# Patient Record
Sex: MALE | Race: OTHER | HISPANIC OR LATINO | URBAN - METROPOLITAN AREA
[De-identification: names, ages, dates, MRNs, and addresses within clinical notes are randomized per-mention and may not be internally consistent; named-entity substitution may affect disease eponyms.]

---

## 2017-08-28 ENCOUNTER — INPATIENT (INPATIENT)
Facility: HOSPITAL | Age: 52
LOS: 7 days | Discharge: HOME | DRG: 444 | End: 2017-09-05
Attending: INTERNAL MEDICINE | Admitting: INTERNAL MEDICINE
Payer: MEDICAID

## 2017-08-28 VITALS
OXYGEN SATURATION: 97 % | WEIGHT: 149.91 LBS | DIASTOLIC BLOOD PRESSURE: 92 MMHG | TEMPERATURE: 98 F | RESPIRATION RATE: 17 BRPM | HEART RATE: 81 BPM | SYSTOLIC BLOOD PRESSURE: 128 MMHG

## 2017-08-28 LAB
ALBUMIN SERPL ELPH-MCNC: 2.6 G/DL — LOW (ref 3.3–5)
ALP SERPL-CCNC: 210 U/L — HIGH (ref 40–120)
ALT FLD-CCNC: 122 U/L — HIGH (ref 10–45)
ANION GAP SERPL CALC-SCNC: 12 MMOL/L — SIGNIFICANT CHANGE UP (ref 5–17)
APPEARANCE UR: CLEAR — SIGNIFICANT CHANGE UP
APTT BLD: 32.2 SEC — SIGNIFICANT CHANGE UP (ref 27.5–37.4)
AST SERPL-CCNC: 157 U/L — HIGH (ref 10–40)
BASOPHILS NFR BLD AUTO: 0.6 % — SIGNIFICANT CHANGE UP (ref 0–2)
BILIRUB SERPL-MCNC: 4.6 MG/DL — HIGH (ref 0.2–1.2)
BILIRUB UR-MCNC: NEGATIVE — SIGNIFICANT CHANGE UP
BLD GP AB SCN SERPL QL: NEGATIVE — SIGNIFICANT CHANGE UP
BUN SERPL-MCNC: 9 MG/DL — SIGNIFICANT CHANGE UP (ref 7–23)
CALCIUM SERPL-MCNC: 8.4 MG/DL — SIGNIFICANT CHANGE UP (ref 8.4–10.5)
CHLORIDE SERPL-SCNC: 94 MMOL/L — LOW (ref 96–108)
CO2 SERPL-SCNC: 27 MMOL/L — SIGNIFICANT CHANGE UP (ref 22–31)
COLOR SPEC: YELLOW — SIGNIFICANT CHANGE UP
CREAT SERPL-MCNC: 0.9 MG/DL — SIGNIFICANT CHANGE UP (ref 0.5–1.3)
DIFF PNL FLD: (no result)
EOSINOPHIL NFR BLD AUTO: 1.2 % — SIGNIFICANT CHANGE UP (ref 0–6)
EPI CELLS # UR: SIGNIFICANT CHANGE UP /HPF
GLUCOSE SERPL-MCNC: 130 MG/DL — HIGH (ref 70–99)
GLUCOSE UR QL: NEGATIVE — SIGNIFICANT CHANGE UP
HCT VFR BLD CALC: 35.2 % — LOW (ref 39–50)
HGB BLD-MCNC: 12.8 G/DL — LOW (ref 13–17)
INR BLD: 1.11 — SIGNIFICANT CHANGE UP (ref 0.88–1.16)
KETONES UR-MCNC: NEGATIVE — SIGNIFICANT CHANGE UP
LACTATE SERPL-SCNC: 1.9 MMOL/L — SIGNIFICANT CHANGE UP (ref 0.5–2)
LACTATE SERPL-SCNC: 2.2 MMOL/L — HIGH (ref 0.5–2)
LEUKOCYTE ESTERASE UR-ACNC: NEGATIVE — SIGNIFICANT CHANGE UP
LIDOCAIN IGE QN: 43 U/L — SIGNIFICANT CHANGE UP (ref 7–60)
LYMPHOCYTES # BLD AUTO: 32 % — SIGNIFICANT CHANGE UP (ref 13–44)
MCHC RBC-ENTMCNC: 30.8 PG — SIGNIFICANT CHANGE UP (ref 27–34)
MCHC RBC-ENTMCNC: 36.4 G/DL — HIGH (ref 32–36)
MCV RBC AUTO: 84.8 FL — SIGNIFICANT CHANGE UP (ref 80–100)
MONOCYTES NFR BLD AUTO: 8.8 % — SIGNIFICANT CHANGE UP (ref 2–14)
NEUTROPHILS NFR BLD AUTO: 57.4 % — SIGNIFICANT CHANGE UP (ref 43–77)
NITRITE UR-MCNC: NEGATIVE — SIGNIFICANT CHANGE UP
PH UR: 6.5 — SIGNIFICANT CHANGE UP (ref 5–8)
PLATELET # BLD AUTO: 152 K/UL — SIGNIFICANT CHANGE UP (ref 150–400)
POTASSIUM SERPL-MCNC: 3.4 MMOL/L — LOW (ref 3.5–5.3)
POTASSIUM SERPL-SCNC: 3.4 MMOL/L — LOW (ref 3.5–5.3)
PROT SERPL-MCNC: 6.9 G/DL — SIGNIFICANT CHANGE UP (ref 6–8.3)
PROT UR-MCNC: NEGATIVE MG/DL — SIGNIFICANT CHANGE UP
PROTHROM AB SERPL-ACNC: 12.3 SEC — SIGNIFICANT CHANGE UP (ref 9.8–12.7)
RBC # BLD: 4.15 M/UL — LOW (ref 4.2–5.8)
RBC # FLD: 18.6 % — HIGH (ref 10.3–16.9)
RBC CASTS # UR COMP ASSIST: < 5 /HPF — SIGNIFICANT CHANGE UP
RH IG SCN BLD-IMP: POSITIVE — SIGNIFICANT CHANGE UP
SODIUM SERPL-SCNC: 133 MMOL/L — LOW (ref 135–145)
SP GR SPEC: <=1.005 — SIGNIFICANT CHANGE UP (ref 1–1.03)
UROBILINOGEN FLD QL: 0.2 E.U./DL — SIGNIFICANT CHANGE UP
WBC # BLD: 6.6 K/UL — SIGNIFICANT CHANGE UP (ref 3.8–10.5)
WBC # FLD AUTO: 6.6 K/UL — SIGNIFICANT CHANGE UP (ref 3.8–10.5)
WBC UR QL: < 5 /HPF — SIGNIFICANT CHANGE UP

## 2017-08-28 PROCEDURE — 71010: CPT | Mod: 26

## 2017-08-28 PROCEDURE — 99285 EMERGENCY DEPT VISIT HI MDM: CPT | Mod: 25

## 2017-08-28 PROCEDURE — 74181 MRI ABDOMEN W/O CONTRAST: CPT | Mod: 26

## 2017-08-28 PROCEDURE — 93010 ELECTROCARDIOGRAM REPORT: CPT

## 2017-08-28 RX ORDER — SODIUM CHLORIDE 9 MG/ML
1000 INJECTION INTRAMUSCULAR; INTRAVENOUS; SUBCUTANEOUS ONCE
Qty: 0 | Refills: 0 | Status: COMPLETED | OUTPATIENT
Start: 2017-08-28 | End: 2017-08-28

## 2017-08-28 RX ORDER — SODIUM CHLORIDE 9 MG/ML
1000 INJECTION, SOLUTION INTRAVENOUS
Qty: 0 | Refills: 0 | Status: DISCONTINUED | OUTPATIENT
Start: 2017-08-28 | End: 2017-08-29

## 2017-08-28 RX ORDER — ONDANSETRON 8 MG/1
4 TABLET, FILM COATED ORAL EVERY 6 HOURS
Qty: 0 | Refills: 0 | Status: DISCONTINUED | OUTPATIENT
Start: 2017-08-28 | End: 2017-09-05

## 2017-08-28 RX ORDER — HYDROMORPHONE HYDROCHLORIDE 2 MG/ML
0.5 INJECTION INTRAMUSCULAR; INTRAVENOUS; SUBCUTANEOUS EVERY 4 HOURS
Qty: 0 | Refills: 0 | Status: DISCONTINUED | OUTPATIENT
Start: 2017-08-28 | End: 2017-09-04

## 2017-08-28 RX ORDER — METOCLOPRAMIDE HCL 10 MG
10 TABLET ORAL EVERY 6 HOURS
Qty: 0 | Refills: 0 | Status: DISCONTINUED | OUTPATIENT
Start: 2017-08-28 | End: 2017-09-05

## 2017-08-28 RX ORDER — SODIUM CHLORIDE 9 MG/ML
1000 INJECTION, SOLUTION INTRAVENOUS
Qty: 0 | Refills: 0 | Status: DISCONTINUED | OUTPATIENT
Start: 2017-08-28 | End: 2017-08-31

## 2017-08-28 RX ORDER — SODIUM CHLORIDE 9 MG/ML
1000 INJECTION INTRAMUSCULAR; INTRAVENOUS; SUBCUTANEOUS
Qty: 0 | Refills: 0 | Status: DISCONTINUED | OUTPATIENT
Start: 2017-08-28 | End: 2017-08-28

## 2017-08-28 RX ORDER — PIPERACILLIN AND TAZOBACTAM 4; .5 G/20ML; G/20ML
3.38 INJECTION, POWDER, LYOPHILIZED, FOR SOLUTION INTRAVENOUS ONCE
Qty: 0 | Refills: 0 | Status: COMPLETED | OUTPATIENT
Start: 2017-08-28 | End: 2017-08-28

## 2017-08-28 RX ORDER — HYDROMORPHONE HYDROCHLORIDE 2 MG/ML
1 INJECTION INTRAMUSCULAR; INTRAVENOUS; SUBCUTANEOUS EVERY 4 HOURS
Qty: 0 | Refills: 0 | Status: DISCONTINUED | OUTPATIENT
Start: 2017-08-28 | End: 2017-09-04

## 2017-08-28 RX ORDER — HEPARIN SODIUM 5000 [USP'U]/ML
5000 INJECTION INTRAVENOUS; SUBCUTANEOUS EVERY 8 HOURS
Qty: 0 | Refills: 0 | Status: DISCONTINUED | OUTPATIENT
Start: 2017-08-28 | End: 2017-08-29

## 2017-08-28 RX ADMIN — SODIUM CHLORIDE 1000 MILLILITER(S): 9 INJECTION INTRAMUSCULAR; INTRAVENOUS; SUBCUTANEOUS at 17:24

## 2017-08-28 RX ADMIN — PIPERACILLIN AND TAZOBACTAM 200 GRAM(S): 4; .5 INJECTION, POWDER, LYOPHILIZED, FOR SOLUTION INTRAVENOUS at 16:24

## 2017-08-28 RX ADMIN — HEPARIN SODIUM 5000 UNIT(S): 5000 INJECTION INTRAVENOUS; SUBCUTANEOUS at 23:01

## 2017-08-28 RX ADMIN — SODIUM CHLORIDE 125 MILLILITER(S): 9 INJECTION INTRAMUSCULAR; INTRAVENOUS; SUBCUTANEOUS at 15:30

## 2017-08-28 NOTE — ED PROVIDER NOTE - OBJECTIVE STATEMENT
51 yo male c/frank weight loss 15 lbs in past month assoc RUQ abd pain- no fevers or chills-- no diarrhea  no recent travel or infectious disease exposures-- had outpatient RUQ U/S  2 hrs ago before arrival in ED- showing hydrops of GB- no stones - abd CT shows no obvious mass- sent to ED for admission - concern for decompression due to risk of rupture of GB assoc with distention-

## 2017-08-28 NOTE — H&P ADULT - NSHPLABSRESULTS_GEN_ALL_CORE
12.8   6.6   )-----------( 152      ( 28 Aug 2017 15:38 )             35.2   08-28    133<L>  |  94<L>  |  9   ----------------------------<  130<H>  3.4<L>   |  27  |  0.90    Ca    8.4      28 Aug 2017 15:38    TPro  6.9  /  Alb  2.6<L>  /  TBili  4.6<H>  /  DBili  x   /  AST  157<H>  /  ALT  122<H>  /  AlkPhos  210<H>  08-28      Outside imaging report that states:  Dedicated gallbladder sonography was performed revealing the gallbladder to be enlarged measuring 14.6 x 6.1 x 6.2 cm.  There is no sonographic Molina sign, calculus, sludge, thickening of the gallbladder wall, pericholecystic collection, or duct dilatation.  The CBD measures 0.46 cm.  Read by Dr. Moiz Garza [phone] [facility] 8/28/17

## 2017-08-28 NOTE — H&P ADULT - HISTORY OF PRESENT ILLNESS
52 East Timorese Male who presents to the [facility] ED with a 1 month hx of right sided abdominal pain, nausea and occasional NBNB emesis. He notes that this all started after returning from a trip to [facility]. He reports significant PO intolerance, with subsequent 20 lb wt loss, but denies Diarrhea/CP/SOB/F/C/Dysuria.  Pt went to see his PCP today in the office who then sent him for outside imaging, which included a CT A/P and a RUQ U/S.    PMH: none  Meds: none  All: NKDA  PSH: remote omfs procedure.  Social: +ETOH abuse, reports drinking at least 5 beers daily, never smoker, denies IVDA  FH: sister with ovarian ca

## 2017-08-28 NOTE — ED ADULT NURSE NOTE - OBJECTIVE STATEMENT
abdominal pian over a month sine he went to [place] [facility] and weight loss; thought it was the food he ate , but went to see PCP and has US and CT and sent for further evaluation and treatment for possible gall bladder problems

## 2017-08-28 NOTE — H&P ADULT - ASSESSMENT
52M with a significantly dilated GB and hyperbilirubinemia.  - Will obtain MRCP to evaluate for stricture or obstruction although CBD is not dilated.  - No current indication for abx as this has been ongoing for 1 month and pt has not developed any leukocytosis and/or fever.  - Will appreciate GI recs pending MRCP results for possible ERCP  - NPO/IVF  - SQH/SCDs/OOBA  - Will obtain ova/parasite stool studies and hepatitis panel  - Pain/nausea control PRN  - Ativan PRN for s/s of ETOH withdrawal  - Plan discussed with Chief on Call and Dr. Russell.

## 2017-08-28 NOTE — H&P ADULT - NSHPPHYSICALEXAM_GEN_ALL_CORE
Vital Signs Last 24 Hrs  T(C): 36.6 (28 Aug 2017 17:13), Max: 36.8 (28 Aug 2017 14:25)  T(F): 97.9 (28 Aug 2017 17:13), Max: 98.3 (28 Aug 2017 14:25)  HR: 72 (28 Aug 2017 17:13) (65 - 81)  BP: 123/79 (28 Aug 2017 17:13) (123/79 - 129/84)  BP(mean): --  RR: 16 (28 Aug 2017 17:13) (16 - 17)  SpO2: 99% (28 Aug 2017 17:13) (97% - 99%)    Gen: Age appropriate male in NAD  HEENT: Icteric sclerae  CV: RRR  Resp: CTAB  Abd: +BS, Soft, ttp RUQ with + Molina's sign, nondistended  Ext: WWP, no peripheral edema

## 2017-08-28 NOTE — ED ADULT TRIAGE NOTE - CHIEF COMPLAINT QUOTE
intermittent diffused RLQ abdominal cramping, loss of appetite, vomiting x 1 month. Last BM today.  Sent in by Dr. Diomedes Capellan because he was told he "has enlarged bowels on CT scan done today."  Denies fever, chills, loose stools, recent travels.

## 2017-08-29 LAB
-  CANDIDA ALBICANS: SIGNIFICANT CHANGE UP
-  CANDIDA GLABRATA: SIGNIFICANT CHANGE UP
-  CANDIDA KRUSEI: SIGNIFICANT CHANGE UP
-  CANDIDA PARAPSILOSIS: SIGNIFICANT CHANGE UP
-  CANDIDA TROPICALIS: SIGNIFICANT CHANGE UP
-  COAGULASE NEGATIVE STAPHYLOCOCCUS: SIGNIFICANT CHANGE UP
-  K. PNEUMONIAE GROUP: SIGNIFICANT CHANGE UP
-  KPC RESISTANCE GENE: SIGNIFICANT CHANGE UP
-  STREPTOCOCCUS SP. (NOT GRP A, B OR S PNEUMONIAE): SIGNIFICANT CHANGE UP
A BAUMANNII DNA SPEC QL NAA+PROBE: SIGNIFICANT CHANGE UP
ALBUMIN SERPL ELPH-MCNC: 2.3 G/DL — LOW (ref 3.3–5)
ALP SERPL-CCNC: 172 U/L — HIGH (ref 40–120)
ALT FLD-CCNC: 94 U/L — HIGH (ref 10–45)
ANION GAP SERPL CALC-SCNC: 11 MMOL/L — SIGNIFICANT CHANGE UP (ref 5–17)
APTT BLD: 36.9 SEC — SIGNIFICANT CHANGE UP (ref 27.5–37.4)
AST SERPL-CCNC: 113 U/L — HIGH (ref 10–40)
BILIRUB DIRECT SERPL-MCNC: 2 MG/DL — HIGH (ref 0–0.2)
BILIRUB DIRECT SERPL-MCNC: 2.1 MG/DL — HIGH (ref 0–0.2)
BILIRUB INDIRECT FLD-MCNC: 2 MG/DL — HIGH (ref 0.2–1)
BILIRUB SERPL-MCNC: 4 MG/DL — HIGH (ref 0.2–1.2)
BILIRUB SERPL-MCNC: 4.1 MG/DL — HIGH (ref 0.2–1.2)
BUN SERPL-MCNC: 8 MG/DL — SIGNIFICANT CHANGE UP (ref 7–23)
CALCIUM SERPL-MCNC: 7.9 MG/DL — LOW (ref 8.4–10.5)
CHLORIDE SERPL-SCNC: 97 MMOL/L — SIGNIFICANT CHANGE UP (ref 96–108)
CO2 SERPL-SCNC: 24 MMOL/L — SIGNIFICANT CHANGE UP (ref 22–31)
CREAT SERPL-MCNC: 0.9 MG/DL — SIGNIFICANT CHANGE UP (ref 0.5–1.3)
CULTURE RESULTS: NO GROWTH — SIGNIFICANT CHANGE UP
E CLOAC COMP DNA BLD POS QL NAA+PROBE: SIGNIFICANT CHANGE UP
E COLI DNA BLD POS QL NAA+NON-PROBE: SIGNIFICANT CHANGE UP
ENTEROCOC DNA BLD POS QL NAA+NON-PROBE: SIGNIFICANT CHANGE UP
ENTEROCOC DNA BLD POS QL NAA+NON-PROBE: SIGNIFICANT CHANGE UP
GLUCOSE SERPL-MCNC: 95 MG/DL — SIGNIFICANT CHANGE UP (ref 70–99)
GP B STREP DNA BLD POS QL NAA+NON-PROBE: SIGNIFICANT CHANGE UP
HAEM INFLU DNA BLD POS QL NAA+NON-PROBE: SIGNIFICANT CHANGE UP
HAV IGM SER-ACNC: SIGNIFICANT CHANGE UP
HBV CORE IGM SER-ACNC: SIGNIFICANT CHANGE UP
HBV SURFACE AG SER-ACNC: SIGNIFICANT CHANGE UP
HCT VFR BLD CALC: 33.7 % — LOW (ref 39–50)
HCV AB S/CO SERPL IA: 0.17 S/CO — SIGNIFICANT CHANGE UP
HCV AB SERPL-IMP: SIGNIFICANT CHANGE UP
HGB BLD-MCNC: 11.9 G/DL — LOW (ref 13–17)
INR BLD: 1.11 — SIGNIFICANT CHANGE UP (ref 0.88–1.16)
K OXYTOCA DNA BLD POS QL NAA+NON-PROBE: SIGNIFICANT CHANGE UP
L MONOCYTOG DNA BLD POS QL NAA+NON-PROBE: SIGNIFICANT CHANGE UP
MAGNESIUM SERPL-MCNC: 1.7 MG/DL — SIGNIFICANT CHANGE UP (ref 1.6–2.6)
MCHC RBC-ENTMCNC: 30 PG — SIGNIFICANT CHANGE UP (ref 27–34)
MCHC RBC-ENTMCNC: 35.3 G/DL — SIGNIFICANT CHANGE UP (ref 32–36)
MCV RBC AUTO: 84.9 FL — SIGNIFICANT CHANGE UP (ref 80–100)
METHOD TYPE: SIGNIFICANT CHANGE UP
MRSA SPEC QL CULT: SIGNIFICANT CHANGE UP
MSSA DNA SPEC QL NAA+PROBE: SIGNIFICANT CHANGE UP
N MEN ISLT CULT: SIGNIFICANT CHANGE UP
P AERUGINOSA DNA BLD POS NAA+NON-PROBE: SIGNIFICANT CHANGE UP
PHOSPHATE SERPL-MCNC: 2.6 MG/DL — SIGNIFICANT CHANGE UP (ref 2.5–4.5)
PLATELET # BLD AUTO: 145 K/UL — LOW (ref 150–400)
POTASSIUM SERPL-MCNC: 4 MMOL/L — SIGNIFICANT CHANGE UP (ref 3.5–5.3)
POTASSIUM SERPL-SCNC: 4 MMOL/L — SIGNIFICANT CHANGE UP (ref 3.5–5.3)
PROT SERPL-MCNC: 6.3 G/DL — SIGNIFICANT CHANGE UP (ref 6–8.3)
PROTEUS SP DNA BLD POS QL NAA+NON-PROBE: SIGNIFICANT CHANGE UP
PROTHROM AB SERPL-ACNC: 12.4 SEC — SIGNIFICANT CHANGE UP (ref 9.8–12.7)
RBC # BLD: 3.97 M/UL — LOW (ref 4.2–5.8)
RBC # FLD: 19 % — HIGH (ref 10.3–16.9)
S MARCESCENS DNA BLD POS NAA+NON-PROBE: SIGNIFICANT CHANGE UP
S PNEUM DNA BLD POS QL NAA+NON-PROBE: SIGNIFICANT CHANGE UP
S PYO DNA BLD POS QL NAA+NON-PROBE: SIGNIFICANT CHANGE UP
SODIUM SERPL-SCNC: 132 MMOL/L — LOW (ref 135–145)
SPECIMEN SOURCE: SIGNIFICANT CHANGE UP
WBC # BLD: 6 K/UL — SIGNIFICANT CHANGE UP (ref 3.8–10.5)
WBC # FLD AUTO: 6 K/UL — SIGNIFICANT CHANGE UP (ref 3.8–10.5)

## 2017-08-29 PROCEDURE — 78227 HEPATOBIL SYST IMAGE W/DRUG: CPT | Mod: 26

## 2017-08-29 RX ORDER — SODIUM CHLORIDE 9 MG/ML
1000 INJECTION, SOLUTION INTRAVENOUS
Qty: 0 | Refills: 0 | Status: DISCONTINUED | OUTPATIENT
Start: 2017-08-29 | End: 2017-08-31

## 2017-08-29 RX ORDER — POTASSIUM PHOSPHATE, MONOBASIC POTASSIUM PHOSPHATE, DIBASIC 236; 224 MG/ML; MG/ML
30 INJECTION, SOLUTION INTRAVENOUS ONCE
Qty: 0 | Refills: 0 | Status: COMPLETED | OUTPATIENT
Start: 2017-08-29 | End: 2017-08-29

## 2017-08-29 RX ORDER — MAGNESIUM SULFATE 500 MG/ML
2 VIAL (ML) INJECTION ONCE
Qty: 0 | Refills: 0 | Status: COMPLETED | OUTPATIENT
Start: 2017-08-29 | End: 2017-08-29

## 2017-08-29 RX ADMIN — HEPARIN SODIUM 5000 UNIT(S): 5000 INJECTION INTRAVENOUS; SUBCUTANEOUS at 13:09

## 2017-08-29 RX ADMIN — SODIUM CHLORIDE 120 MILLILITER(S): 9 INJECTION, SOLUTION INTRAVENOUS at 22:06

## 2017-08-29 RX ADMIN — POTASSIUM PHOSPHATE, MONOBASIC POTASSIUM PHOSPHATE, DIBASIC 130 MILLIMOLE(S): 236; 224 INJECTION, SOLUTION INTRAVENOUS at 11:09

## 2017-08-29 RX ADMIN — Medication 50 GRAM(S): at 09:51

## 2017-08-29 RX ADMIN — HEPARIN SODIUM 5000 UNIT(S): 5000 INJECTION INTRAVENOUS; SUBCUTANEOUS at 06:18

## 2017-08-29 RX ADMIN — SODIUM CHLORIDE 100 MILLILITER(S): 9 INJECTION, SOLUTION INTRAVENOUS at 06:18

## 2017-08-29 NOTE — DIETITIAN INITIAL EVALUATION ADULT. - OTHER INFO
51yo M p/w abdominal pain, nausea, and vomiting x 1 month found to have a distended gallbladder with cholestasis. Pending MRCP results for possible ERCP.  No diet order at present. Pt states UBW is 168lb, current BW is 150lb. No c/o of N/V at this time. Discussed diet advancement process and purpose of EnsureClears on clear liquid phase. If pt to remain NPO, consider alternate route of nutrition. NKFA or dietary restrictions. Skin and GI WDL per flowsheet.

## 2017-08-29 NOTE — PROGRESS NOTE ADULT - ASSESSMENT
52M with a significantly dilated GB and hyperbilirubinemia.  - Will obtain MRCP to evaluate for stricture or obstruction although CBD is not dilated.  - No current indication for abx as this has been ongoing for 1 month and pt has not developed any leukocytosis and/or fever.  - Will appreciate GI recs pending MRCP results for possible ERCP  - NPO/IVF  - SQH/SCDs/OOBA  - Will obtain ova/parasite stool studies and hepatitis panel  - Pain/nausea control PRN  - Banana bag daily  - Ativan PRN for s/s of ETOH withdrawal 52M with a significantly dilated GB and hyperbilirubinemia.    - No current indication for abx as this has been ongoing for 1 month and pt has not developed any leukocytosis and/or fever.  - Will appreciate GI recs pending MRCP results for possible ERCP  - NPO/IVF  - SQH/SCDs/OOBA  - Will obtain ova/parasite stool studies and hepatitis panel  - Pain/nausea control PRN  - Banana bag daily  - Ativan PRN for s/s of ETOH withdrawal

## 2017-08-29 NOTE — DIETITIAN INITIAL EVALUATION ADULT. - ENERGY NEEDS
Height: 5'7" Weight: 150lbs, IBW 148lbs+/-10%, %%, BMI 23.5  ABW used for calculations as pt between % of IBW.

## 2017-08-29 NOTE — CONSULT NOTE ADULT - SUBJECTIVE AND OBJECTIVE BOX
51 YO Turkmen  with no significant past medical history p/w abdominal pain, nausea, and vomiting x 1 month. Pt states that 1 month ago he was visiting his parents in [facility], which he hasn't been to in 30 years, and subsequently developed abdominal pain, nausea, and vomiting. Abdominal pain is located in the RUQ and epigastrium, worsened with food intake. He states that he is unable to sleep at night d/t the pain. In [facility], pt denies sick contacts, states that he did not drink water, swim in any pools or rivers, and did not walk barefoot on the beach. Pt reports 20 lb unintentional weight loss, decreased PO intake, occasional episodes of scant blood in his vomitus and jaundice, but denies fever, CP, SOB, itching, melena, hematochezia, diarrhea, changes in urinary habits, joint, pain, or rash.     Pt saw his PMD, who performed a RUQ U/S and CT scan and he was referred to the ED.    Allergies    No Known Allergies    Intolerances    HOME MEDICATIONS: Denies    MEDICATIONS:  MEDICATIONS  (STANDING):  heparin  Injectable 5000 Unit(s) SubCutaneous every 8 hours  lactated ringers. 1000 milliLiter(s) (120 mL/Hr) IV Continuous <Continuous>  multivitamin/thiamine/folic acid in sodium chloride 0.9% 1000 milliLiter(s) (100 mL/Hr) IV Continuous <Continuous>  potassium phosphate IVPB 30 milliMole(s) IV Intermittent once    MEDICATIONS  (PRN):  HYDROmorphone  Injectable 0.5 milliGRAM(s) IV Push every 4 hours PRN Moderate Pain  HYDROmorphone  Injectable 1 milliGRAM(s) IV Push every 4 hours PRN Severe Pain  ondansetron Injectable 4 milliGRAM(s) IV Push every 6 hours PRN Nausea  metoclopramide Injectable 10 milliGRAM(s) IV Push every 6 hours PRN Nausea and/or Vomiting    PAST MEDICAL & SURGICAL HISTORY:  Alcohol abuse  No significant past surgical history    FAMILY HISTORY:  Sister: Ovarian Ca  Mother: Heart disease, HTN  No family history of gallbladder ca, pancreatic ca, gastric ca, colon ca, liver ca.    SOCIAL HISTORY:  Tobacoo: Denies  Alcohol: 6 beers daily; no h/o withdrawal or EtOH-related seizures  Illicit Drugs: Denies    REVIEW OF SYSTEMS: per HPI    Vital Signs Last 24 Hrs  T(C): 36.4 (29 Aug 2017 08:44), Max: 36.8 (28 Aug 2017 14:25)  T(F): 97.6 (29 Aug 2017 08:44), Max: 98.3 (28 Aug 2017 14:25)  HR: 68 (29 Aug 2017 08:44) (58 - 81)  BP: 131/74 (29 Aug 2017 08:44) (101/67 - 134/92)  BP(mean): --  RR: 16 (29 Aug 2017 08:44) (16 - 18)  SpO2: 97% (29 Aug 2017 08:44) (97% - 99%)    08-28 @ 07:01  -  08-29 @ 07:00  --------------------------------------------------------  IN: 1380 mL / OUT: 0 mL / NET: 1380 mL    08-29 @ 07:01 - 08-29 @ 10:30  --------------------------------------------------------  IN: 250 mL / OUT: 0 mL / NET: 250 mL    PHYSICAL EXAM:    General: Well developed; well nourished; in no acute distress, jaundice  HEENT: MMM, B/L scleral icterus  Gastrointestinal: Soft, Epigastric and RUQ TTP; non-distended; Normal bowel sounds; No rebound or guarding  Extremities: Normal range of motion, No clubbing, cyanosis or edema  Neurological: Alert and oriented x3  Skin: Warm and dry. No obvious rash    LABS:                        11.9   6.0   )-----------( 145      ( 29 Aug 2017 05:48 )             33.7     08-29    132<L>  |  97  |  8   ----------------------------<  95  4.0   |  24  |  0.90    Ca    7.9<L>      29 Aug 2017 05:48  Phos  2.6     08-29  Mg     1.7     08-29    TPro  6.3  /  Alb  2.3<L>  /  TBili  4.1<H>  /  DBili  2.1<H>  /  AST  113<H>  /  ALT  94<H>  /  AlkPhos  172<H>  08-29    RADIOLOGY & ADDITIONAL STUDIES:  MRI Abdomen w/o Cont (08.28.17 @ 23:15)  FINDINGS: The liver is mildly enlarged. There is mild hepatic steatosis.   No focal hepatic lesion. No intrahepatic biliary ductal dilatation.   Common bile duct normal in caliber, measuring 0.5 cm. No   choledocholithiasis or common bile duct stricture. The gallbladder is   again distended. No gallstone is seen. No cystic duct mass or stone is   seen. There is pericholecystic fluid. Spleen unremarkable. Main   pancreatic duct normal in caliber, measuring 0.3 cm. No pancreaticmass.   Adrenal glands and kidneys unremarkable.    No lymphadenopathy in abdomen.    IMPRESSION:  1. As on the CT scan and ultrasound exam of 8/28/2017, there is again   gallbladder distention and pericholecystic fluid, the cause of which is   not seen. The differential diagnosis includes acalculus cholecystitis,   vasculitis, parasitic infection, and occult tumor.    2. Mildly enlarged liver with mild fatty infiltration. 53 YO [place] Islander  with no significant past medical history p/w abdominal pain, nausea, and vomiting x 1 month. Pt states that 1 month ago he was visiting his parents in [facility], which he hasn't been to in 30 years, and subsequently developed abdominal pain, nausea, and vomiting. Abdominal pain is located in the RUQ and epigastrium, worsened with food intake. He states that he is unable to sleep at night d/t the pain. In [facility], pt denies sick contacts, states that he did not drink water, swim in any pools or rivers, and did not walk barefoot on the beach. Pt reports 20 lb unintentional weight loss, decreased PO intake, occasional episodes of scant blood in his vomitus and jaundice, but denies fever, CP, SOB, itching, melena, hematochezia, diarrhea, changes in urinary habits, joint, pain, or rash.     Pt saw his PMD, who performed a RUQ U/S and CT scan and he was referred to the ED.    Allergies    No Known Allergies    Intolerances    HOME MEDICATIONS: Denies    MEDICATIONS:  MEDICATIONS  (STANDING):  heparin  Injectable 5000 Unit(s) SubCutaneous every 8 hours  lactated ringers. 1000 milliLiter(s) (120 mL/Hr) IV Continuous <Continuous>  multivitamin/thiamine/folic acid in sodium chloride 0.9% 1000 milliLiter(s) (100 mL/Hr) IV Continuous <Continuous>  potassium phosphate IVPB 30 milliMole(s) IV Intermittent once    MEDICATIONS  (PRN):  HYDROmorphone  Injectable 0.5 milliGRAM(s) IV Push every 4 hours PRN Moderate Pain  HYDROmorphone  Injectable 1 milliGRAM(s) IV Push every 4 hours PRN Severe Pain  ondansetron Injectable 4 milliGRAM(s) IV Push every 6 hours PRN Nausea  metoclopramide Injectable 10 milliGRAM(s) IV Push every 6 hours PRN Nausea and/or Vomiting    PAST MEDICAL & SURGICAL HISTORY:  Alcohol abuse  No significant past surgical history    FAMILY HISTORY:  Sister: Ovarian Ca  Mother: Heart disease, HTN  No family history of gallbladder ca, pancreatic ca, gastric ca, colon ca, liver ca.    SOCIAL HISTORY:  Tobacoo: Denies  Alcohol: 6 beers daily; no h/o withdrawal or EtOH-related seizures  Illicit Drugs: Denies    REVIEW OF SYSTEMS: per HPI    Vital Signs Last 24 Hrs  T(C): 36.4 (29 Aug 2017 08:44), Max: 36.8 (28 Aug 2017 14:25)  T(F): 97.6 (29 Aug 2017 08:44), Max: 98.3 (28 Aug 2017 14:25)  HR: 68 (29 Aug 2017 08:44) (58 - 81)  BP: 131/74 (29 Aug 2017 08:44) (101/67 - 134/92)  BP(mean): --  RR: 16 (29 Aug 2017 08:44) (16 - 18)  SpO2: 97% (29 Aug 2017 08:44) (97% - 99%)    08-28 @ 07:01  -  08-29 @ 07:00  --------------------------------------------------------  IN: 1380 mL / OUT: 0 mL / NET: 1380 mL    08-29 @ 07:01 - 08-29 @ 10:30  --------------------------------------------------------  IN: 250 mL / OUT: 0 mL / NET: 250 mL    PHYSICAL EXAM:    General: Well developed; well nourished; in no acute distress, jaundice  HEENT: MMM, B/L scleral icterus  Gastrointestinal: Soft, Epigastric and RUQ TTP; non-distended; Normal bowel sounds; No rebound or guarding  Extremities: Normal range of motion, No clubbing, cyanosis or edema  Neurological: Alert and oriented x3  Skin: Warm and dry. No obvious rash    LABS:                        11.9   6.0   )-----------( 145      ( 29 Aug 2017 05:48 )             33.7     08-29    132<L>  |  97  |  8   ----------------------------<  95  4.0   |  24  |  0.90    Ca    7.9<L>      29 Aug 2017 05:48  Phos  2.6     08-29  Mg     1.7     08-29    TPro  6.3  /  Alb  2.3<L>  /  TBili  4.1<H>  /  DBili  2.1<H>  /  AST  113<H>  /  ALT  94<H>  /  AlkPhos  172<H>  08-29    Acute Hepatitis Panel (08.29.17 @ 05:48)    Hepatitis C Virus S/CO Ratio: 0.17 S/CO    Hepatitis C Virus Interpretation: Nonreact: Hepatitis C AB  S/CO Ratio                        Interpretation  < 1.0                                     Non-Reactive  1.0 - 4.9                           Weakly-Reactive  > 5.0                                 Reactive  Non-Reactive: A person withNonreact: a non-reactive HCV antibody result is  considered uninfected.  No further action is needed unless recent  infection is suspected.  In these cases, consider repeat testing later to  detect seroconversion..  Weakly-Reactive: HCV antibody test is abnormaNonreact: l, HCV RNA Qualitative test  will follow.  Reactive: HCV antibody test is abnormal, HCV RNA Qualitative test will  follow.  Note: HCV antibody testing is performed on the Abbott  system.    Hepatitis B Core IgM Antibody: Nonreact    Hepatitis B Surface Antigen: Nonreact    Hepatitis A IgM Antibody: Nonreact    RADIOLOGY & ADDITIONAL STUDIES:  MRI Abdomen w/o Cont (08.28.17 @ 23:15)  FINDINGS: The liver is mildly enlarged. There is mild hepatic steatosis.   No focal hepatic lesion. No intrahepatic biliary ductal dilatation.   Common bile duct normal in caliber, measuring 0.5 cm. No   choledocholithiasis or common bile duct stricture. The gallbladder is   again distended. No gallstone is seen. No cystic duct mass or stone is   seen. There is pericholecystic fluid. Spleen unremarkable. Main   pancreatic duct normal in caliber, measuring 0.3 cm. No pancreatic mass.   Adrenal glands and kidneys unremarkable.    No lymphadenopathy in abdomen.    IMPRESSION:  1. As on the CT scan and ultrasound exam of 8/28/2017, there is again   gallbladder distention and pericholecystic fluid, the cause of which is   not seen. The differential diagnosis includes acalculus cholecystitis,   vasculitis, parasitic infection, and occult tumor.    2. Mildly enlarged liver with mild fatty infiltration.

## 2017-08-29 NOTE — CONSULT NOTE ADULT - ASSESSMENT
51 YO Malaysian  with no significant past medical history p/w abdominal pain, nausea, and vomiting x 1 month found to have a distended gallbladder with cholestasis. 51 YO Senegalese M with no significant past medical history p/w abdominal pain, nausea, and vomiting x 1 month found to have a distended gallbladder with cholestasis.    Malignancy vs 51 YO Mauritian M with no significant past medical history p/w abdominal pain, nausea, and vomiting x 1 month found to have a distended gallbladder with cholestasis.    Gallbladder distension with cholestasis 2/2 malignancy vs Mirizzi syndrome vs stricture vs biliary sludge  - Given history of weight loss associated with gallbladder distension seen on MRCP without CBD dilatation or evidence of choledocholithiasis, highly suspicious for underlying malignancy; however, pt without intra-abdominal LAD and no pancreatic mass seen on MRI which argues against malignant etiology  - Will plan for ERCP in AM  - Please keep pt NPO p MN, hold AM HSQ  - F/u stool studies  - Case d/w Dr. Rogers

## 2017-08-29 NOTE — PROGRESS NOTE ADULT - SUBJECTIVE AND OBJECTIVE BOX
comfortable this am No vomiting VS stable afeb  In RR Clear chest Tenderness in upper abd No edema Labs with slight decrease in obstructive chems

## 2017-08-29 NOTE — PROGRESS NOTE ADULT - SUBJECTIVE AND OBJECTIVE BOX
O/N: admitted w/ dilated GB and hyperbilirubinemia, MRCP shows distended GB / no biliary dilatation O/N: admitted w/ dilated GB and hyperbilirubinemia, MRCP shows distended GB / no biliary dilatation    SUBJECTIVE: Patient seen and examined bedside by surgery team. Patient doing well. No complaints and reports slight improvement in abdominal pain.       heparin  Injectable 5000 Unit(s) SubCutaneous every 8 hours      Vital Signs Last 24 Hrs  T(C): 36.2 (29 Aug 2017 05:32), Max: 36.8 (28 Aug 2017 14:25)  T(F): 97.1 (29 Aug 2017 05:32), Max: 98.3 (28 Aug 2017 14:25)  HR: 58 (29 Aug 2017 05:32) (58 - 81)  BP: 101/67 (29 Aug 2017 05:32) (101/67 - 134/92)  BP(mean): --  RR: 18 (29 Aug 2017 05:32) (16 - 18)  SpO2: 98% (29 Aug 2017 05:32) (97% - 99%)  I&O's Detail    28 Aug 2017 07:01  -  29 Aug 2017 07:00  --------------------------------------------------------  IN:    lactated ringers.: 1080 mL    multivitamin/thiamine/folic acid in sodium chloride 0.9%: 300 mL  Total IN: 1380 mL    OUT:  Total OUT: 0 mL    Total NET: 1380 mL          General: NAD, resting comfortably in bed  C/V: NSR  Pulm: Nonlabored breathing, no respiratory distress  Abd: soft, NT/ND.  Extrem: WWP, no edema, SCDs in place        LABS:                        11.9   6.0   )-----------( 145      ( 29 Aug 2017 05:48 )             33.7     08-29    132<L>  |  97  |  8   ----------------------------<  95  4.0   |  24  |  0.90    Ca    7.9<L>      29 Aug 2017 05:48  Phos  2.6     08-29  Mg     1.7     08-29    TPro  6.3  /  Alb  2.3<L>  /  TBili  4.1<H>  /  DBili  x   /  AST  113<H>  /  ALT  94<H>  /  AlkPhos  172<H>  08-29    PT/INR - ( 29 Aug 2017 05:48 )   PT: 12.4 sec;   INR: 1.11          PTT - ( 29 Aug 2017 05:48 )  PTT:36.9 sec  Urinalysis Basic - ( 28 Aug 2017 15:31 )    Color: Yellow / Appearance: Clear / SG: <=1.005 / pH: x  Gluc: x / Ketone: NEGATIVE  / Bili: NEGATIVE / Urobili: 0.2 E.U./dL   Blood: x / Protein: NEGATIVE mg/dL / Nitrite: NEGATIVE   Leuk Esterase: NEGATIVE / RBC: < 5 /HPF / WBC < 5 /HPF   Sq Epi: x / Non Sq Epi: Rare /HPF / Bacteria: x        RADIOLOGY & ADDITIONAL STUDIES:    < from: MRI Abdomen w/o Cont (08.28.17 @ 23:15) >  INTERPRETATION:  Resident preliminary report    MRCP dated 8/28/2017 11:15 PM    INDICATION: Dilated gallbladder and hyperbilirubinemia    TECHNIQUE: MRI of the abdomen was performed in the axial, coronal, and   radial projections with attention tothe biliary system.      PRIOR STUDIES: Not available    FINDINGS:   The gallbladder is dilated measuring 5.5 cm in diameter. No dilated   intrahepatic or extrahepatic bile ducts are seen. The gallbladder is   normal in appearance.  The pancreas is normal in appearance.  The   pancreatic duct is not dilated.  The pancreatic duct inserts onto the   major papilla. Images of the upper abdomen demonstrate no focal hepatic   abnormalities. No splenic abnormalities are seen.    The adrenal glands are unremarkable.  There is no hydronephrosis.      No abdominal aortic aneurysm is seen.  No retroperitoneal lymphadenopathy   is seen.      IMPRESSION:  Distended gallbladder.    No biliary dilatation.    < end of copied text >

## 2017-08-30 LAB
ALBUMIN SERPL ELPH-MCNC: 2.2 G/DL — LOW (ref 3.3–5)
ALP SERPL-CCNC: 142 U/L — HIGH (ref 40–120)
ALT FLD-CCNC: 66 U/L — HIGH (ref 10–45)
ANION GAP SERPL CALC-SCNC: 10 MMOL/L — SIGNIFICANT CHANGE UP (ref 5–17)
AST SERPL-CCNC: 81 U/L — HIGH (ref 10–40)
BILIRUB DIRECT SERPL-MCNC: 1.6 MG/DL — HIGH (ref 0–0.2)
BILIRUB INDIRECT FLD-MCNC: 1.4 MG/DL — HIGH (ref 0.2–1)
BILIRUB SERPL-MCNC: 3 MG/DL — HIGH (ref 0.2–1.2)
BUN SERPL-MCNC: 10 MG/DL — SIGNIFICANT CHANGE UP (ref 7–23)
CALCIUM SERPL-MCNC: 8 MG/DL — LOW (ref 8.4–10.5)
CHLORIDE SERPL-SCNC: 99 MMOL/L — SIGNIFICANT CHANGE UP (ref 96–108)
CO2 SERPL-SCNC: 25 MMOL/L — SIGNIFICANT CHANGE UP (ref 22–31)
CREAT SERPL-MCNC: 0.9 MG/DL — SIGNIFICANT CHANGE UP (ref 0.5–1.3)
GLUCOSE SERPL-MCNC: 101 MG/DL — HIGH (ref 70–99)
HCT VFR BLD CALC: 28.8 % — LOW (ref 39–50)
HGB BLD-MCNC: 10.1 G/DL — LOW (ref 13–17)
MAGNESIUM SERPL-MCNC: 1.6 MG/DL — SIGNIFICANT CHANGE UP (ref 1.6–2.6)
MCHC RBC-ENTMCNC: 30.4 PG — SIGNIFICANT CHANGE UP (ref 27–34)
MCHC RBC-ENTMCNC: 35.1 G/DL — SIGNIFICANT CHANGE UP (ref 32–36)
MCV RBC AUTO: 86.7 FL — SIGNIFICANT CHANGE UP (ref 80–100)
PHOSPHATE SERPL-MCNC: 2.8 MG/DL — SIGNIFICANT CHANGE UP (ref 2.5–4.5)
PLATELET # BLD AUTO: 147 K/UL — LOW (ref 150–400)
POTASSIUM SERPL-MCNC: 4 MMOL/L — SIGNIFICANT CHANGE UP (ref 3.5–5.3)
POTASSIUM SERPL-SCNC: 4 MMOL/L — SIGNIFICANT CHANGE UP (ref 3.5–5.3)
PROT SERPL-MCNC: 5.7 G/DL — LOW (ref 6–8.3)
RBC # BLD: 3.32 M/UL — LOW (ref 4.2–5.8)
RBC # FLD: 18.6 % — HIGH (ref 10.3–16.9)
SODIUM SERPL-SCNC: 134 MMOL/L — LOW (ref 135–145)
WBC # BLD: 5.2 K/UL — SIGNIFICANT CHANGE UP (ref 3.8–10.5)
WBC # FLD AUTO: 5.2 K/UL — SIGNIFICANT CHANGE UP (ref 3.8–10.5)

## 2017-08-30 PROCEDURE — 43262 ENDO CHOLANGIOPANCREATOGRAPH: CPT

## 2017-08-30 PROCEDURE — 76705 ECHO EXAM OF ABDOMEN: CPT | Mod: 26

## 2017-08-30 RX ORDER — INDOMETHACIN 50 MG
50 CAPSULE ORAL DAILY
Qty: 0 | Refills: 0 | Status: COMPLETED | OUTPATIENT
Start: 2017-08-30 | End: 2017-08-31

## 2017-08-30 RX ORDER — MAGNESIUM SULFATE 500 MG/ML
2 VIAL (ML) INJECTION EVERY 6 HOURS
Qty: 0 | Refills: 0 | Status: COMPLETED | OUTPATIENT
Start: 2017-08-30 | End: 2017-08-30

## 2017-08-30 RX ADMIN — HYDROMORPHONE HYDROCHLORIDE 0.5 MILLIGRAM(S): 2 INJECTION INTRAMUSCULAR; INTRAVENOUS; SUBCUTANEOUS at 14:31

## 2017-08-30 RX ADMIN — SODIUM CHLORIDE 100 MILLILITER(S): 9 INJECTION, SOLUTION INTRAVENOUS at 07:46

## 2017-08-30 RX ADMIN — Medication 50 GRAM(S): at 19:44

## 2017-08-30 RX ADMIN — HYDROMORPHONE HYDROCHLORIDE 0.5 MILLIGRAM(S): 2 INJECTION INTRAMUSCULAR; INTRAVENOUS; SUBCUTANEOUS at 14:16

## 2017-08-30 RX ADMIN — Medication 50 GRAM(S): at 12:38

## 2017-08-30 RX ADMIN — Medication 50 MILLIGRAM(S): at 22:35

## 2017-08-30 RX ADMIN — HYDROMORPHONE HYDROCHLORIDE 1 MILLIGRAM(S): 2 INJECTION INTRAMUSCULAR; INTRAVENOUS; SUBCUTANEOUS at 22:35

## 2017-08-30 NOTE — DISCHARGE NOTE ADULT - HOSPITAL COURSE
52M presenting with 1 month hx of R-sided abdominal pain, PO intolerance, NBNB vomiting, found to have hyperbilirubinemia and significantly dilated GB on CT A/P.  On night of admission patient had an MRCP done which showed distended gallbladder with no biliary dilation. The following morning the patients abdominal pain had improved and hepatitis panel was negative. GI was consulted and a HIDA scan was done, which showed an overall normal gallbladder although it was long in length. GI  had concerns regarding the patients presentation and report of weight loss and decided to follow up with ERCP for cytopath brushings.  Patient was on IVF and Banana bag during his stay.  Patient was initially on a low fat diet and then was made NPO for ERCP.  During stay that patients LFT’s improved. 52M from [facility] presenting with 1 month hx of R-sided abdominal pain, PO intolerance, NBNB vomiting, found to have hyperbilirubinemia and significantly dilated GB on CT A/P and U/S.  On night of admission patient had an MRCP done which showed distended gallbladder with no biliary dilation. The following morning the patients abdominal pain had improved and hepatitis panel was negative. GI was consulted and a HIDA scan was done, which showed an overall normal gallbladder although it was long in length. GI  had concerns regarding the patient's presentation and report of weight loss and decided to follow up with ERCP with sphincterotomy, revealing acalculous cholecystitis.  Patient was on IVF and Banana bag during his stay.  Patient was initially on a low fat diet and then was made NPO for ERCP. Following procedure, patient had elevated lipase and amylase consistent with possible post-ERCP pancreatitis as well as new abdominal pain/distension and a temperature of 101.5F (tylenol was given).  Patient was transferred on Sep 1 to [facility] from [facility]. Patient received symptomatic support with IVF, analgesia, and diet was advanced as tolerated. On last day of inpatient stay, patient able to pass BM and was beginning to tolerate a regular diet.      At time of discharge, patient was hemodynamically stable and medically cleared.

## 2017-08-30 NOTE — DISCHARGE NOTE ADULT - OTHER SIGNIFICANT FINDINGS
< from: US Abdomen Limited (08.30.17 @ 11:36) >  IMPRESSION:  Distended gallbladder with no gallstones and a negative sonographic   Molina's sign which may be secondary to prolonged fasting. However,   nonspecific trace pericholecystic fluid is once again noted, similar to   the prior studies. Therefore, correlation with clinical findings and   report of HIDA nuclear scan is recommended.     Hepatomegaly and hepatic steatosis.    < from: CT Abdomen and Pelvis w/ Oral Cont (09.02.17 @ 00:14) >  IMPRESSION:     Interval development of acute pancreatitis.  < from: MRI Abdomen w/o Cont (08.28.17 @ 23:15) >  IMPRESSION:  1. As on the CT scan and ultrasound exam of 8/28/2017, there is again   gallbladder distention and pericholecystic fluid, the cause of which is   not seen. The differential diagnosis includes acalculus cholecystitis,   vasculitis, parasitic infection, and occult tumor.    2. Mildly enlarged liver with mild fatty infiltration.    < end of copied text >    Normal size gallbladder. No radiopaque gallstones.    Wall thickening of duodenum probably secondary to surrounding   inflammation. No evidence of bowel perforation.    Bulging papilla could be secondary to recent ERCP or acute pancreatitis.     Trace bilateral pleural effusions. Small amount of ascites.    Lipase, Serum (08.31.17 @ 07:11)    Lipase, Serum: 1574 U/L    Amylase, Serum Total (08.31.17 @ 07:11)     Amylase, Serum Total: 419 U/L

## 2017-08-30 NOTE — DISCHARGE NOTE ADULT - PATIENT PORTAL LINK FT
“You can access the [facility] Patient Portal, offered by [facility], by registering with the following website: [email or web address]”

## 2017-08-30 NOTE — PROGRESS NOTE ADULT - SUBJECTIVE AND OBJECTIVE BOX
O/N: CATHERINE, VSS  8/29: improved abd. pain in AM; hep panel neg; GI consulted; HIDA scan completed today; ERCP tomorrow; NPO at midnight, d/c'd subQ heparin; O/N: CATHERINE, VSS  8/29: improved abd. pain in AM; hep panel neg; GI consulted; HIDA scan completed today; ERCP tomorrow; NPO at midnight, d/c'd subQ heparin;     Patient seen and examined bedside with chief resident. Patient has no complaints. Patient reports that his pain has improved from initial presentation but he is still having pain. Patient denies nausea, vomiting, chest pain, shortness of breath.        Vital Signs Last 24 Hrs  T(C): 36.4 (30 Aug 2017 05:17), Max: 36.7 (29 Aug 2017 12:10)  T(F): 97.5 (30 Aug 2017 05:17), Max: 98.1 (29 Aug 2017 12:10)  HR: 69 (30 Aug 2017 05:17) (60 - 78)  BP: 107/78 (30 Aug 2017 05:17) (107/78 - 131/74)  BP(mean): --  RR: 16 (30 Aug 2017 05:17) (16 - 18)  SpO2: 97% (30 Aug 2017 05:17) (95% - 97%)    I&O's Detail    29 Aug 2017 07:01  -  30 Aug 2017 07:00  --------------------------------------------------------  IN:    IV PiggyBack: 310 mL    lactated ringers.: 1080 mL    multivitamin/thiamine/folic acid in sodium chloride 0.9%: 610 mL    multivitamin/thiamine/folic acid in sodium chloride 0.9%: 200 mL    Oral Fluid: 280 mL  Total IN: 2480 mL    OUT:    Voided: 750 mL  Total OUT: 750 mL    Total NET: 1730 mL        PHYSICAL EXAM:      Constitutional: NAD. resting comfortably in bed.    Gastrointestinal: soft. ND. tenderness to palpation in RUQ.     Extremities: no edema. SCDs in place.

## 2017-08-30 NOTE — DISCHARGE NOTE ADULT - CARE PROVIDER_API CALL
Claude Russell), Surgery  [address] [place] [address]  Phone: [phone]  Fax: [phone] Claude Russell), Surgery  [address] [place] [address]  Phone: [phone]  Fax: [phone]    Diomedes Capellan), Internal Medicine  [address] [place] [address]  Phone: [phone]  Fax: [phone]

## 2017-08-30 NOTE — PROGRESS NOTE ADULT - SUBJECTIVE AND OBJECTIVE BOX
No vomiting Pain is less Voiding well Had a BM  Remains afeb VS stable Scleral icterus unch In RR Clear chest Abd is non distended and soft with decreased RUQ tenderness

## 2017-08-30 NOTE — DISCHARGE NOTE ADULT - PLAN OF CARE
Recovery Please resume all regular home medications unless otherwise specified.  Please get plenty of rest, continue to ambulate several times per day, and drink adequate amounts of fluids.   Continue to eat a low fat diet.  Avoid excessive alcohol consumption.  Please follow-up with your surgeon Dr. Russell in 1-2 weeks.   Please follow-up with GI in 1-2 weeks Resume normal diet and fluid intake as tolerated and continue to recover, taking plenty of rest. Pancreatitis occurs when the pancreas is inflamed. The pancreas is an organ that makes insulin  as well as enzymes (digestive juices) that help your body digest food. The most common  causes of pancreatitis is caused by alcohol or gallstones. However, it is also a complication of the procedure you underwent, ERCP. Following this procedure, you had a fever, abdominal pain and distension, as well as nausea and poor appetite. The treatment for pancreatitis is supportive as the inflammation decreases on its own. As a result, you were initially kept without food and given fluids and pain medication. As you improved, you were able to take in more food. Please continue to eat whatever you can tolerate, taking care to avoid overly fatty foods. Drink plenty of water. Should you experience high fevers, severe abdominal pain, nausea and vomiting again please return to the Emergency Department. Recovery. Please follow up with Gastroenterology as an outpatient after seeing Dr Capellan. You had two procedures, an MRCP (Magnetic resonance cholangiopancreatography) and an ERCP (Endoscopic retrograde cholangiopancreatography). There was no evidence of an obstructing gallbladder stone. You were found to have gallbladder distension, consistent with Acalculous Cholecystitis.   Please resume all regular home medications unless otherwise specified.  Please get plenty of rest, continue to ambulate several times per day, and drink adequate amounts of fluids.   Continue to eat a low fat diet.  Avoid excessive alcohol consumption.  Please follow-up with your surgeon Dr. Russell in 1-2 weeks.   Please follow-up with GI in 1-2 weeks.   Should you experience further left upper quadrant pain with fevers, nausea, and vomiting similar to the episode that prompted your visit to the hospital, this may indicate a flare of gallbladder inflammation. Please visit the Emergency Department in that case. You may require a surgery to remove the gallbladder in the future.

## 2017-08-30 NOTE — PROGRESS NOTE ADULT - ATTENDING COMMENTS
The current workup is as follows: distended gb on CT and US but no stones and no evidence of CBD obsx. The HIDA shows emptying of the gb with CCK and then immediately filling with contrast.    Will get ERCP today to further assess    LFTs are trending down. ? is this alcoholic hepatitis.    At this point, no indication for surgical intervention.

## 2017-08-30 NOTE — PROGRESS NOTE ADULT - ASSESSMENT
52M with a significantly dilated GB and hyperbilirubinemia.  - No current indication for abx as this has been ongoing for 1 month and pt has not developed any leukocytosis and/or fever.  - NPO/IVF  - SQH/SCDs/OOBA  - f/u ova/parasite stool studies   - Pain/nausea control PRN  - Banana bag daily  - Ativan PRN for s/s of ETOH withdrawal  - ERCP in AM 52M with a significantly dilated GB and hyperbilirubinemia.    - No current indication for abx as this has been ongoing for 1 month and pt has not developed any leukocytosis and/or fever.  - NPO/IVF  - SQH/SCDs/OOBA  - f/u ova/parasite stool studies   - Pain/nausea control PRN  - Banana bag daily  - Ativan PRN for s/s of ETOH withdrawal  - ERCP in AM, f/u with GI

## 2017-08-30 NOTE — DISCHARGE NOTE ADULT - CARE PLAN
Principal Discharge DX:	Dilated gallbladder  Goal:	Recovery  Instructions for follow-up, activity and diet:	Please resume all regular home medications unless otherwise specified.  Please get plenty of rest, continue to ambulate several times per day, and drink adequate amounts of fluids.   Continue to eat a low fat diet.  Avoid excessive alcohol consumption.  Please follow-up with your surgeon Dr. Russell in 1-2 weeks.   Please follow-up with GI in 1-2 weeks Principal Discharge DX:	Dilated gallbladder  Goal:	Recovery. Please follow up with Gastroenterology as an outpatient after seeing Dr Capellan.  Instructions for follow-up, activity and diet:	You had two procedures, an MRCP (Magnetic resonance cholangiopancreatography) and an ERCP (Endoscopic retrograde cholangiopancreatography). There was no evidence of an obstructing gallbladder stone. You were found to have gallbladder distension, consistent with Acalculous Cholecystitis.   Please resume all regular home medications unless otherwise specified.  Please get plenty of rest, continue to ambulate several times per day, and drink adequate amounts of fluids.   Continue to eat a low fat diet.  Avoid excessive alcohol consumption.  Please follow-up with your surgeon Dr. Russell in 1-2 weeks.   Please follow-up with GI in 1-2 weeks.   Should you experience further left upper quadrant pain with fevers, nausea, and vomiting similar to the episode that prompted your visit to the hospital, this may indicate a flare of gallbladder inflammation. Please visit the Emergency Department in that case. You may require a surgery to remove the gallbladder in the future.  Secondary Diagnosis:	Pancreatitis, acute  Goal:	Resume normal diet and fluid intake as tolerated and continue to recover, taking plenty of rest.  Instructions for follow-up, activity and diet:	Pancreatitis occurs when the pancreas is inflamed. The pancreas is an organ that makes insulin  as well as enzymes (digestive juices) that help your body digest food. The most common  causes of pancreatitis is caused by alcohol or gallstones. However, it is also a complication of the procedure you underwent, ERCP. Following this procedure, you had a fever, abdominal pain and distension, as well as nausea and poor appetite. The treatment for pancreatitis is supportive as the inflammation decreases on its own. As a result, you were initially kept without food and given fluids and pain medication. As you improved, you were able to take in more food. Please continue to eat whatever you can tolerate, taking care to avoid overly fatty foods. Drink plenty of water. Should you experience high fevers, severe abdominal pain, nausea and vomiting again please return to the Emergency Department.

## 2017-08-31 LAB
ALBUMIN SERPL ELPH-MCNC: 2.5 G/DL — LOW (ref 3.3–5)
ALP SERPL-CCNC: 155 U/L — HIGH (ref 40–120)
ALT FLD-CCNC: 68 U/L — HIGH (ref 10–45)
AMYLASE P1 CFR SERPL: 419 U/L — HIGH (ref 25–125)
ANION GAP SERPL CALC-SCNC: 12 MMOL/L — SIGNIFICANT CHANGE UP (ref 5–17)
AST SERPL-CCNC: 81 U/L — HIGH (ref 10–40)
BILIRUB SERPL-MCNC: 4.4 MG/DL — HIGH (ref 0.2–1.2)
BUN SERPL-MCNC: 8 MG/DL — SIGNIFICANT CHANGE UP (ref 7–23)
CALCIUM SERPL-MCNC: 8.7 MG/DL — SIGNIFICANT CHANGE UP (ref 8.4–10.5)
CHLORIDE SERPL-SCNC: 96 MMOL/L — SIGNIFICANT CHANGE UP (ref 96–108)
CO2 SERPL-SCNC: 25 MMOL/L — SIGNIFICANT CHANGE UP (ref 22–31)
CREAT SERPL-MCNC: 0.9 MG/DL — SIGNIFICANT CHANGE UP (ref 0.5–1.3)
CRP SERPL-MCNC: 0.5 MG/DL — HIGH (ref 0–0.4)
GLUCOSE SERPL-MCNC: 124 MG/DL — HIGH (ref 70–99)
HCT VFR BLD CALC: 30.6 % — LOW (ref 39–50)
HGB BLD-MCNC: 10.5 G/DL — LOW (ref 13–17)
LIDOCAIN IGE QN: 1574 U/L — HIGH (ref 7–60)
MAGNESIUM SERPL-MCNC: 1.8 MG/DL — SIGNIFICANT CHANGE UP (ref 1.6–2.6)
MCHC RBC-ENTMCNC: 30.5 PG — SIGNIFICANT CHANGE UP (ref 27–34)
MCHC RBC-ENTMCNC: 34.3 G/DL — SIGNIFICANT CHANGE UP (ref 32–36)
MCV RBC AUTO: 89 FL — SIGNIFICANT CHANGE UP (ref 80–100)
PHOSPHATE SERPL-MCNC: 3.2 MG/DL — SIGNIFICANT CHANGE UP (ref 2.5–4.5)
PLATELET # BLD AUTO: 181 K/UL — SIGNIFICANT CHANGE UP (ref 150–400)
POTASSIUM SERPL-MCNC: 4.6 MMOL/L — SIGNIFICANT CHANGE UP (ref 3.5–5.3)
POTASSIUM SERPL-SCNC: 4.6 MMOL/L — SIGNIFICANT CHANGE UP (ref 3.5–5.3)
PROT SERPL-MCNC: 6.4 G/DL — SIGNIFICANT CHANGE UP (ref 6–8.3)
RBC # BLD: 3.44 M/UL — LOW (ref 4.2–5.8)
RBC # FLD: 17.8 % — HIGH (ref 10.3–16.9)
SODIUM SERPL-SCNC: 133 MMOL/L — LOW (ref 135–145)
WBC # BLD: 8.3 K/UL — SIGNIFICANT CHANGE UP (ref 3.8–10.5)
WBC # FLD AUTO: 8.3 K/UL — SIGNIFICANT CHANGE UP (ref 3.8–10.5)

## 2017-08-31 RX ORDER — SODIUM CHLORIDE 9 MG/ML
1000 INJECTION, SOLUTION INTRAVENOUS
Qty: 0 | Refills: 0 | Status: DISCONTINUED | OUTPATIENT
Start: 2017-08-31 | End: 2017-09-01

## 2017-08-31 RX ORDER — MAGNESIUM SULFATE 500 MG/ML
2 VIAL (ML) INJECTION ONCE
Qty: 0 | Refills: 0 | Status: COMPLETED | OUTPATIENT
Start: 2017-08-31 | End: 2017-08-31

## 2017-08-31 RX ADMIN — HYDROMORPHONE HYDROCHLORIDE 1 MILLIGRAM(S): 2 INJECTION INTRAMUSCULAR; INTRAVENOUS; SUBCUTANEOUS at 06:03

## 2017-08-31 RX ADMIN — SODIUM CHLORIDE 80 MILLILITER(S): 9 INJECTION, SOLUTION INTRAVENOUS at 22:00

## 2017-08-31 RX ADMIN — HYDROMORPHONE HYDROCHLORIDE 0.5 MILLIGRAM(S): 2 INJECTION INTRAMUSCULAR; INTRAVENOUS; SUBCUTANEOUS at 20:17

## 2017-08-31 RX ADMIN — Medication 50 GRAM(S): at 11:25

## 2017-08-31 RX ADMIN — SODIUM CHLORIDE 100 MILLILITER(S): 9 INJECTION, SOLUTION INTRAVENOUS at 11:24

## 2017-08-31 RX ADMIN — HYDROMORPHONE HYDROCHLORIDE 0.5 MILLIGRAM(S): 2 INJECTION INTRAMUSCULAR; INTRAVENOUS; SUBCUTANEOUS at 10:39

## 2017-08-31 RX ADMIN — Medication 50 MILLIGRAM(S): at 11:25

## 2017-08-31 RX ADMIN — HYDROMORPHONE HYDROCHLORIDE 0.5 MILLIGRAM(S): 2 INJECTION INTRAMUSCULAR; INTRAVENOUS; SUBCUTANEOUS at 11:24

## 2017-08-31 RX ADMIN — Medication 50 MILLIGRAM(S): at 11:45

## 2017-08-31 RX ADMIN — HYDROMORPHONE HYDROCHLORIDE 0.5 MILLIGRAM(S): 2 INJECTION INTRAMUSCULAR; INTRAVENOUS; SUBCUTANEOUS at 21:39

## 2017-08-31 NOTE — PROGRESS NOTE ADULT - SUBJECTIVE AND OBJECTIVE BOX
Pain recurred after tests yesterday  No vomiting Remains afeb VS stable In RR Abd is soft with active BS Icterus unchanged

## 2017-08-31 NOTE — PROGRESS NOTE ADULT - SUBJECTIVE AND OBJECTIVE BOX
Pt seen and examined at bedside in AM. CATHERINE overnight. Pt reports abdominal pain, however, overall feels improved from prior. Denies fever, chills, CP, SOB, nausea, vomiting, melena, hematochezia, diarrhea. Pt was started on liquid diet, which he states he is tolerating.     Allergies    No Known Allergies    Intolerances    MEDICATIONS:  MEDICATIONS  (STANDING):  multivitamin/thiamine/folic acid in sodium chloride 0.9% 1000 milliLiter(s) (100 mL/Hr) IV Continuous <Continuous>  lactated ringers. 1000 milliLiter(s) (80 mL/Hr) IV Continuous <Continuous>    MEDICATIONS  (PRN):  HYDROmorphone  Injectable 0.5 milliGRAM(s) IV Push every 4 hours PRN Moderate Pain  HYDROmorphone  Injectable 1 milliGRAM(s) IV Push every 4 hours PRN Severe Pain  ondansetron Injectable 4 milliGRAM(s) IV Push every 6 hours PRN Nausea  metoclopramide Injectable 10 milliGRAM(s) IV Push every 6 hours PRN Nausea and/or Vomiting    Vital Signs Last 24 Hrs  T(C): 36.1 (31 Aug 2017 16:21), Max: 36.4 (31 Aug 2017 05:42)  T(F): 96.9 (31 Aug 2017 16:21), Max: 97.6 (31 Aug 2017 05:42)  HR: 66 (31 Aug 2017 16:21) (66 - 86)  BP: 126/76 (31 Aug 2017 16:21) (111/63 - 133/90)  BP(mean): --  RR: 16 (31 Aug 2017 16:21) (15 - 17)  SpO2: 96% (31 Aug 2017 16:21) (95% - 98%)    08-30 @ 07:01 - 08-31 @ 07:00  --------------------------------------------------------  IN: 1440 mL / OUT: 0 mL / NET: 1440 mL    08-31 @ 07:01 - 08-31 @ 21:47  --------------------------------------------------------  IN: 1560 mL / OUT: 0 mL / NET: 1560 mL    PHYSICAL EXAM:    General: Well developed; well nourished; in no acute distress  HEENT: MMM, conjunctiva and sclera clear  Gastrointestinal: Soft epigastric TTP non-distended; No hepatosplenomegaly. No rebound or guarding  Skin: Warm and dry. No obvious rash    LABS:  CBC Full  -  ( 31 Aug 2017 07:11 )  WBC Count : 8.3 K/uL  Hemoglobin : 10.5 g/dL  Hematocrit : 30.6 %  Platelet Count - Automated : 181 K/uL  Mean Cell Volume : 89.0 fL  Mean Cell Hemoglobin : 30.5 pg  Mean Cell Hemoglobin Concentration : 34.3 g/dL    08-31    133<L>  |  96  |  8   ----------------------------<  124<H>  4.6   |  25  |  0.90    Ca    8.7      31 Aug 2017 07:11  Phos  3.2     08-31  Mg     1.8     08-31    TPro  6.4  /  Alb  2.5<L>  /  TBili  4.4<H>  /  DBili  x   /  AST  81<H>  /  ALT  68<H>  /  AlkPhos  155<H>  08-31    C-Reactive Protein, Serum: 0.50 mg/dL (08.31.17 @ 07:11)    Lipase, Serum: 1574 U/L (08.31.17 @ 07:11)    Amylase, Serum Total: 419 U/L (08.31.17 @ 07:11)    RADIOLOGY & ADDITIONAL STUDIES: No new radiologic studies

## 2017-08-31 NOTE — PROGRESS NOTE ADULT - ASSESSMENT
51 YO [place]  with no significant past medical history p/w abdominal pain, nausea, and vomiting x 1 month found to have a distended gallbladder with cholestasis.    Gallbladder distension with cholestasis 2/2 malignancy vs Mirizzi syndrome vs stricture vs biliary sludge  - s/p ERCP with sphincterotomy with acalculous cholecystitis  - Pt with abdominal pain post ERCP with elevated lipase consistent with possible post-ERCP pancreatitis, however, pt clinically improved from prior, tolerating liquid diet, and no longer with nausea and vomiting  - Pain control  - Monitor abdominal exam  - Trend daily LFTs  - F/u stool studies

## 2017-08-31 NOTE — PROGRESS NOTE ADULT - SUBJECTIVE AND OBJECTIVE BOX
O/N: No blockage, most likely acalculous cholecystitis, sphincterotomy with brushings.   8/30: LFTs improved; US ordered for GB size; ERCP today for cytopath brushings (possible malignancy);     SUBJECTIVE: Patient seen and examined bedside by surgery team. Patient complained of abdominal pain following ERCP yesterday afternoon. Reported improvement of pain when seen in AM. No other complaints and ready for clear diet.         Vital Signs Last 24 Hrs  T(C): 36.4 (31 Aug 2017 05:42), Max: 36.6 (30 Aug 2017 20:40)  T(F): 97.6 (31 Aug 2017 05:42), Max: 97.9 (30 Aug 2017 20:40)  HR: 86 (31 Aug 2017 05:42) (64 - 86)  BP: 111/63 (31 Aug 2017 05:42) (111/63 - 131/82)  BP(mean): --  RR: 17 (31 Aug 2017 05:42) (16 - 18)  SpO2: 95% (31 Aug 2017 05:42) (95% - 99%)  I&O's Detail    30 Aug 2017 07:01  -  31 Aug 2017 07:00  --------------------------------------------------------  IN:    lactated ringers.: 1440 mL  Total IN: 1440 mL    OUT:  Total OUT: 0 mL    Total NET: 1440 mL          General: NAD, resting comfortably in bed  C/V: NSR  Pulm: Nonlabored breathing, no respiratory distress  Abd: soft, non-distended, mild tenderness to palpation  Extrem: WWP, no edema, SCDs in place        LABS:                        10.5   8.3   )-----------( 181      ( 31 Aug 2017 07:11 )             30.6     08-30    134<L>  |  99  |  10  ----------------------------<  101<H>  4.0   |  25  |  0.90    Ca    8.0<L>      30 Aug 2017 06:42  Phos  2.8     08-30  Mg     1.6     08-30    TPro  5.7<L>  /  Alb  2.2<L>  /  TBili  3.0<H>  /  DBili  1.6<H>  /  AST  81<H>  /  ALT  66<H>  /  AlkPhos  142<H>  08-30          RADIOLOGY & ADDITIONAL STUDIES:

## 2017-08-31 NOTE — PROGRESS NOTE ADULT - ASSESSMENT
52M with a significantly dilated GB and hyperbilirubinemia.    s/p ERCP (8/31) and  improved abdominal pain from yesterday afternoon 2/2 to post ERCP pancreatitis    - No current indication for abx as this has been ongoing for 1 month and pt has not developed any leukocytosis and/or fever.  - Advanced to clear diet  - SQH/SCDs/OOBA  - f/u ova/parasite stool studies   - Pain/nausea control PRN  - Banana bag daily  - Ativan PRN for s/s of ETOH withdrawal  - ERCP in AM    Evaluate for tolerance of clear diet. If no abdominal pain and labs normal, discharge in afternoon and instruct to follow up with Dr. Russell scheduling of elective lap sean.

## 2017-09-01 DIAGNOSIS — K82.8 OTHER SPECIFIED DISEASES OF GALLBLADDER: ICD-10-CM

## 2017-09-01 DIAGNOSIS — R63.8 OTHER SYMPTOMS AND SIGNS CONCERNING FOOD AND FLUID INTAKE: ICD-10-CM

## 2017-09-01 DIAGNOSIS — R65.10 SYSTEMIC INFLAMMATORY RESPONSE SYNDROME (SIRS) OF NON-INFECTIOUS ORIGIN WITHOUT ACUTE ORGAN DYSFUNCTION: ICD-10-CM

## 2017-09-01 DIAGNOSIS — K85.90 ACUTE PANCREATITIS WITHOUT NECROSIS OR INFECTION, UNSPECIFIED: ICD-10-CM

## 2017-09-01 LAB
ALBUMIN SERPL ELPH-MCNC: 2.5 G/DL — LOW (ref 3.3–5)
ALBUMIN SERPL ELPH-MCNC: 2.5 G/DL — LOW (ref 3.3–5)
ALP SERPL-CCNC: 128 U/L — HIGH (ref 40–120)
ALP SERPL-CCNC: 140 U/L — HIGH (ref 40–120)
ALT FLD-CCNC: 51 U/L — HIGH (ref 10–45)
ALT FLD-CCNC: 59 U/L — HIGH (ref 10–45)
AMYLASE P1 CFR SERPL: 193 U/L — HIGH (ref 25–125)
ANION GAP SERPL CALC-SCNC: 12 MMOL/L — SIGNIFICANT CHANGE UP (ref 5–17)
ANION GAP SERPL CALC-SCNC: 8 MMOL/L — SIGNIFICANT CHANGE UP (ref 5–17)
AST SERPL-CCNC: 52 U/L — HIGH (ref 10–40)
AST SERPL-CCNC: 65 U/L — HIGH (ref 10–40)
BILIRUB DIRECT SERPL-MCNC: 1.5 MG/DL — HIGH (ref 0–0.2)
BILIRUB INDIRECT FLD-MCNC: 1.6 MG/DL — HIGH (ref 0.2–1)
BILIRUB SERPL-MCNC: 3.1 MG/DL — HIGH (ref 0.2–1.2)
BILIRUB SERPL-MCNC: 3.1 MG/DL — HIGH (ref 0.2–1.2)
BILIRUB SERPL-MCNC: 3.4 MG/DL — HIGH (ref 0.2–1.2)
BUN SERPL-MCNC: 6 MG/DL — LOW (ref 7–23)
BUN SERPL-MCNC: 6 MG/DL — LOW (ref 7–23)
CALCIUM SERPL-MCNC: 7.9 MG/DL — LOW (ref 8.4–10.5)
CALCIUM SERPL-MCNC: 8 MG/DL — LOW (ref 8.4–10.5)
CHLORIDE SERPL-SCNC: 96 MMOL/L — SIGNIFICANT CHANGE UP (ref 96–108)
CHLORIDE SERPL-SCNC: 96 MMOL/L — SIGNIFICANT CHANGE UP (ref 96–108)
CO2 SERPL-SCNC: 24 MMOL/L — SIGNIFICANT CHANGE UP (ref 22–31)
CO2 SERPL-SCNC: 27 MMOL/L — SIGNIFICANT CHANGE UP (ref 22–31)
CREAT SERPL-MCNC: 0.8 MG/DL — SIGNIFICANT CHANGE UP (ref 0.5–1.3)
CREAT SERPL-MCNC: 0.8 MG/DL — SIGNIFICANT CHANGE UP (ref 0.5–1.3)
GLUCOSE SERPL-MCNC: 103 MG/DL — HIGH (ref 70–99)
GLUCOSE SERPL-MCNC: 97 MG/DL — SIGNIFICANT CHANGE UP (ref 70–99)
HCT VFR BLD CALC: 29.9 % — LOW (ref 39–50)
HGB BLD-MCNC: 9.8 G/DL — LOW (ref 13–17)
LACTATE SERPL-SCNC: 1.5 MMOL/L — SIGNIFICANT CHANGE UP (ref 0.5–2)
LIDOCAIN IGE QN: 262 U/L — HIGH (ref 7–60)
MAGNESIUM SERPL-MCNC: 1.5 MG/DL — LOW (ref 1.6–2.6)
MAGNESIUM SERPL-MCNC: 2.1 MG/DL — SIGNIFICANT CHANGE UP (ref 1.6–2.6)
MCHC RBC-ENTMCNC: 31.2 PG — SIGNIFICANT CHANGE UP (ref 27–34)
MCHC RBC-ENTMCNC: 32.8 G/DL — SIGNIFICANT CHANGE UP (ref 32–36)
MCV RBC AUTO: 95.2 FL — SIGNIFICANT CHANGE UP (ref 80–100)
PHOSPHATE SERPL-MCNC: 2.4 MG/DL — LOW (ref 2.5–4.5)
PLATELET # BLD AUTO: 150 K/UL — SIGNIFICANT CHANGE UP (ref 150–400)
POTASSIUM SERPL-MCNC: 3.9 MMOL/L — SIGNIFICANT CHANGE UP (ref 3.5–5.3)
POTASSIUM SERPL-MCNC: 4.5 MMOL/L — SIGNIFICANT CHANGE UP (ref 3.5–5.3)
POTASSIUM SERPL-SCNC: 3.9 MMOL/L — SIGNIFICANT CHANGE UP (ref 3.5–5.3)
POTASSIUM SERPL-SCNC: 4.5 MMOL/L — SIGNIFICANT CHANGE UP (ref 3.5–5.3)
PROT SERPL-MCNC: 6.2 G/DL — SIGNIFICANT CHANGE UP (ref 6–8.3)
PROT SERPL-MCNC: 6.5 G/DL — SIGNIFICANT CHANGE UP (ref 6–8.3)
RBC # BLD: 3.14 M/UL — LOW (ref 4.2–5.8)
RBC # FLD: 15.9 % — SIGNIFICANT CHANGE UP (ref 10.3–16.9)
SODIUM SERPL-SCNC: 131 MMOL/L — LOW (ref 135–145)
SODIUM SERPL-SCNC: 132 MMOL/L — LOW (ref 135–145)
WBC # BLD: 13.9 K/UL — HIGH (ref 3.8–10.5)
WBC # FLD AUTO: 13.9 K/UL — HIGH (ref 3.8–10.5)

## 2017-09-01 PROCEDURE — 74020: CPT | Mod: 26

## 2017-09-01 RX ORDER — IOHEXOL 300 MG/ML
50 INJECTION, SOLUTION INTRAVENOUS ONCE
Qty: 0 | Refills: 0 | Status: COMPLETED | OUTPATIENT
Start: 2017-09-01 | End: 2017-09-01

## 2017-09-01 RX ORDER — SODIUM CHLORIDE 9 MG/ML
1000 INJECTION, SOLUTION INTRAVENOUS
Qty: 0 | Refills: 0 | Status: DISCONTINUED | OUTPATIENT
Start: 2017-09-01 | End: 2017-09-04

## 2017-09-01 RX ORDER — SENNA PLUS 8.6 MG/1
2 TABLET ORAL AT BEDTIME
Qty: 0 | Refills: 0 | Status: DISCONTINUED | OUTPATIENT
Start: 2017-09-01 | End: 2017-09-05

## 2017-09-01 RX ORDER — MAGNESIUM SULFATE 500 MG/ML
2 VIAL (ML) INJECTION EVERY 6 HOURS
Qty: 0 | Refills: 0 | Status: COMPLETED | OUTPATIENT
Start: 2017-09-01 | End: 2017-09-01

## 2017-09-01 RX ORDER — AMPICILLIN SODIUM AND SULBACTAM SODIUM 250; 125 MG/ML; MG/ML
1.5 INJECTION, POWDER, FOR SUSPENSION INTRAMUSCULAR; INTRAVENOUS EVERY 6 HOURS
Qty: 0 | Refills: 0 | Status: DISCONTINUED | OUTPATIENT
Start: 2017-09-02 | End: 2017-09-05

## 2017-09-01 RX ORDER — ACETAMINOPHEN 500 MG
650 TABLET ORAL EVERY 6 HOURS
Qty: 0 | Refills: 0 | Status: DISCONTINUED | OUTPATIENT
Start: 2017-09-01 | End: 2017-09-05

## 2017-09-01 RX ORDER — ACETAMINOPHEN 500 MG
1000 TABLET ORAL ONCE
Qty: 0 | Refills: 0 | Status: COMPLETED | OUTPATIENT
Start: 2017-09-01 | End: 2017-09-01

## 2017-09-01 RX ORDER — DOCUSATE SODIUM 100 MG
100 CAPSULE ORAL
Qty: 0 | Refills: 0 | Status: DISCONTINUED | OUTPATIENT
Start: 2017-09-01 | End: 2017-09-05

## 2017-09-01 RX ORDER — POTASSIUM PHOSPHATE, MONOBASIC POTASSIUM PHOSPHATE, DIBASIC 236; 224 MG/ML; MG/ML
15 INJECTION, SOLUTION INTRAVENOUS ONCE
Qty: 0 | Refills: 0 | Status: COMPLETED | OUTPATIENT
Start: 2017-09-01 | End: 2017-09-01

## 2017-09-01 RX ORDER — AMPICILLIN SODIUM AND SULBACTAM SODIUM 250; 125 MG/ML; MG/ML
1.5 INJECTION, POWDER, FOR SUSPENSION INTRAMUSCULAR; INTRAVENOUS ONCE
Qty: 0 | Refills: 0 | Status: COMPLETED | OUTPATIENT
Start: 2017-09-01 | End: 2017-09-01

## 2017-09-01 RX ORDER — AMPICILLIN SODIUM AND SULBACTAM SODIUM 250; 125 MG/ML; MG/ML
INJECTION, POWDER, FOR SUSPENSION INTRAMUSCULAR; INTRAVENOUS
Qty: 0 | Refills: 0 | Status: DISCONTINUED | OUTPATIENT
Start: 2017-09-01 | End: 2017-09-05

## 2017-09-01 RX ORDER — ALPRAZOLAM 0.25 MG
0.25 TABLET ORAL ONCE
Qty: 0 | Refills: 0 | Status: DISCONTINUED | OUTPATIENT
Start: 2017-09-01 | End: 2017-09-01

## 2017-09-01 RX ORDER — SODIUM CHLORIDE 9 MG/ML
500 INJECTION, SOLUTION INTRAVENOUS ONCE
Qty: 0 | Refills: 0 | Status: COMPLETED | OUTPATIENT
Start: 2017-09-01 | End: 2017-09-01

## 2017-09-01 RX ORDER — ACETAMINOPHEN 500 MG
650 TABLET ORAL ONCE
Qty: 0 | Refills: 0 | Status: COMPLETED | OUTPATIENT
Start: 2017-09-01 | End: 2017-09-01

## 2017-09-01 RX ADMIN — HYDROMORPHONE HYDROCHLORIDE 1 MILLIGRAM(S): 2 INJECTION INTRAMUSCULAR; INTRAVENOUS; SUBCUTANEOUS at 06:00

## 2017-09-01 RX ADMIN — Medication 50 GRAM(S): at 17:44

## 2017-09-01 RX ADMIN — HYDROMORPHONE HYDROCHLORIDE 1 MILLIGRAM(S): 2 INJECTION INTRAMUSCULAR; INTRAVENOUS; SUBCUTANEOUS at 05:17

## 2017-09-01 RX ADMIN — Medication 0.25 MILLIGRAM(S): at 23:53

## 2017-09-01 RX ADMIN — SODIUM CHLORIDE 100 MILLILITER(S): 9 INJECTION, SOLUTION INTRAVENOUS at 11:38

## 2017-09-01 RX ADMIN — POTASSIUM PHOSPHATE, MONOBASIC POTASSIUM PHOSPHATE, DIBASIC 62.5 MILLIMOLE(S): 236; 224 INJECTION, SOLUTION INTRAVENOUS at 11:38

## 2017-09-01 RX ADMIN — HYDROMORPHONE HYDROCHLORIDE 0.5 MILLIGRAM(S): 2 INJECTION INTRAMUSCULAR; INTRAVENOUS; SUBCUTANEOUS at 12:00

## 2017-09-01 RX ADMIN — SODIUM CHLORIDE 200 MILLILITER(S): 9 INJECTION, SOLUTION INTRAVENOUS at 11:38

## 2017-09-01 RX ADMIN — Medication 400 MILLIGRAM(S): at 06:36

## 2017-09-01 RX ADMIN — SODIUM CHLORIDE 200 MILLILITER(S): 9 INJECTION, SOLUTION INTRAVENOUS at 17:45

## 2017-09-01 RX ADMIN — HYDROMORPHONE HYDROCHLORIDE 1 MILLIGRAM(S): 2 INJECTION INTRAMUSCULAR; INTRAVENOUS; SUBCUTANEOUS at 20:21

## 2017-09-01 RX ADMIN — Medication 650 MILLIGRAM(S): at 23:53

## 2017-09-01 RX ADMIN — Medication 50 GRAM(S): at 11:38

## 2017-09-01 RX ADMIN — IOHEXOL 50 MILLILITER(S): 300 INJECTION, SOLUTION INTRAVENOUS at 21:55

## 2017-09-01 RX ADMIN — Medication 650 MILLIGRAM(S): at 16:29

## 2017-09-01 RX ADMIN — SODIUM CHLORIDE 1000 MILLILITER(S): 9 INJECTION, SOLUTION INTRAVENOUS at 07:16

## 2017-09-01 RX ADMIN — HYDROMORPHONE HYDROCHLORIDE 0.5 MILLIGRAM(S): 2 INJECTION INTRAMUSCULAR; INTRAVENOUS; SUBCUTANEOUS at 11:37

## 2017-09-01 RX ADMIN — HYDROMORPHONE HYDROCHLORIDE 1 MILLIGRAM(S): 2 INJECTION INTRAMUSCULAR; INTRAVENOUS; SUBCUTANEOUS at 20:06

## 2017-09-01 RX ADMIN — AMPICILLIN SODIUM AND SULBACTAM SODIUM 100 GRAM(S): 250; 125 INJECTION, POWDER, FOR SUSPENSION INTRAMUSCULAR; INTRAVENOUS at 18:46

## 2017-09-01 NOTE — PROGRESS NOTE ADULT - SUBJECTIVE AND OBJECTIVE BOX
O/N: Tmax 101,4 at 540 AM  8/31: epigastric pain in AM (post ERCP pain?); AM labs: elevated T-bili; amylase 419, lipase 1574, CRP: 0.5; keep on clears; encouraged to drink fluid; decreased LR to 80 [120] O/N: Tmax 101,4 at 540 AM  8/31: epigastric pain in AM (post ERCP pain?); AM labs: elevated T-bili; amylase 419, lipase 1574, CRP: 0.5; keep on clears; encouraged to drink fluid; decreased LR to 80 [120]      Patient seen and examined bedside with chief resident. Patient reports that "he is a bit discouraged as his has been in the hospital for 4 days day and seems like he keeps getting tests done but doesn't seem to be getting any answers."        Vital Signs Last 24 Hrs  T(C): 38.6 (01 Sep 2017 05:39), Max: 38.6 (01 Sep 2017 05:39)  T(F): 101.4 (01 Sep 2017 05:39), Max: 101.4 (01 Sep 2017 05:39)  HR: 84 (01 Sep 2017 05:39) (66 - 84)  BP: 115/69 (01 Sep 2017 05:39) (99/65 - 133/90)  BP(mean): --  RR: 17 (01 Sep 2017 05:39) (15 - 17)  SpO2: 97% (01 Sep 2017 05:39) (96% - 99%)    I&O's Detail    31 Aug 2017 07:01  -  01 Sep 2017 07:00  --------------------------------------------------------  IN:    lactated ringers.: 800 mL    multivitamin/thiamine/folic acid in sodium chloride 0.9%: 1200 mL    Oral Fluid: 540 mL  Total IN: 2540 mL    OUT:    Voided: 500 mL  Total OUT: 500 mL    Total NET: 2040 mL        PHYSICAL EXAM:      Constitutional:    Gastrointestinal:    Genitourinary:    Extremities: O/N: Tmax 101,4 at 540 AM  8/31: epigastric pain in AM (post ERCP pain?); AM labs: elevated T-bili; amylase 419, lipase 1574, CRP: 0.5; keep on clears; encouraged to drink fluid; decreased LR to 80 [120]      Patient seen and examined bedside with chief resident. Patient reports that "he is a bit discouraged as his has been in the hospital for 4 days day and seems like he keeps getting tests done but doesn't seem to be getting any answers." Patient reports he has abdominal pain and feels warm. Patient spiked a 101.4 fever at 5:40 this morning, ice packs and tylenol were given. Patient denies chest pain, shortness of breath, nausea, vomiting.        Vital Signs Last 24 Hrs  T(C): 38.6 (01 Sep 2017 05:39), Max: 38.6 (01 Sep 2017 05:39)  T(F): 101.4 (01 Sep 2017 05:39), Max: 101.4 (01 Sep 2017 05:39)  HR: 84 (01 Sep 2017 05:39) (66 - 84)  BP: 115/69 (01 Sep 2017 05:39) (99/65 - 133/90)  BP(mean): --  RR: 17 (01 Sep 2017 05:39) (15 - 17)  SpO2: 97% (01 Sep 2017 05:39) (96% - 99%)    I&O's Detail    31 Aug 2017 07:01  -  01 Sep 2017 07:00  --------------------------------------------------------  IN:    lactated ringers.: 800 mL    multivitamin/thiamine/folic acid in sodium chloride 0.9%: 1200 mL    Oral Fluid: 540 mL  Total IN: 2540 mL    OUT:    Voided: 500 mL  Total OUT: 500 mL    Total NET: 2040 mL        PHYSICAL EXAM:      Constitutional: NAD. resting comfortably in bed with ice packs around his neck.    Gastrointestinal: soft. ND. tenderness across the lower abdomen.    Extremities: no edema. SCDs in place

## 2017-09-01 NOTE — PROGRESS NOTE ADULT - ASSESSMENT
52M from [facility] presenting with 1 month hx of R-sided abdominal pain, PO intolerance, NBNB vomiting who underwent both MRCP and ERCP w/ sphincterotomy, illustrating acalculous cholecystitis, who now c/o of new abdominal pain and distension with a raised lipase level consistent with possible post ERCP pancreatitis.

## 2017-09-01 NOTE — PROGRESS NOTE ADULT - SUBJECTIVE AND OBJECTIVE BOX
Had abd pain overnight with temp  Pain is different from the pain that led up to the admission  Lipase increased yesterday  Temp is down now VS stable In RR Clear chest Abd is slightly distended and tender

## 2017-09-01 NOTE — PROGRESS NOTE ADULT - PROBLEM SELECTOR PLAN 2
-After ERCP, patient developed new abdominal pain and distension along with a raised lipase level.   - Concern for Post-ERCP Pancreatitis. -After ERCP, patient developed new abdominal pain and distension along with a raised lipase level. Persistent raised Bilirubin.   - Concern for Post-ERCP Pancreatitis  - Patient is NPO, with IVF, and Analgesic treatment.

## 2017-09-01 NOTE — PROGRESS NOTE ADULT - SUBJECTIVE AND OBJECTIVE BOX
PGY1 Transfer Acceptance Note     Hospital Course:   52M from [facility] presenting with 1 month hx of R-sided abdominal pain, PO intolerance, NBNB vomiting, found to have hyperbilirubinemia and significantly dilated GB on CT A/P and U/S.  On night of admission patient had an MRCP done which showed distended gallbladder with no biliary dilation. The following morning the patients abdominal pain had improved and hepatitis panel was negative. GI was consulted and a HIDA scan was done, which showed an overall normal gallbladder although it was long in length. GI  had concerns regarding the patient's presentation and report of weight loss and decided to follow up with ERCP with sphincterotomy, revealing acalculous cholecystitis.  Patient was on IVF and Banana bag during his stay.  Patient was initially on a low fat diet and then was made NPO for ERCP. Following procedure, patient had elevated lipase and amylase consistent with possible post-ERCP pancreatitis as well as new abdominal pain/distension and a temperature of 101.5F (tylenol was given).  Patient was transferred on Sep 1 to [facility] from [facility].     VITAL SIGNS:  T(F): 100.4 (09-01-17 @ 15:51)  HR: 100 (09-01-17 @ 15:51)  BP: 130/76 (09-01-17 @ 15:51)  RR: 16 (09-01-17 @ 15:51)  SpO2: 95% (09-01-17 @ 15:51)  Wt(kg): --    PHYSICAL EXAM:  Constitutional: Patient seated comfortably in bed, of appropriate color, nutrition, and hydration.   HEENT: PERRLA, Normal Range of eye movement with no complaint of diplopia, Normal Hearing  Neck: No LAD, No JVD  Back: Normal spine flexure, No CVA tenderness  Respiratory: Normal air entry, Lungs clear to auscultation b/l w/o wheeze or crepitations.   Cardiovascular: S1 and S2 present - no additional abnormal sounds or murmurs. Normal rhythm and rate of pulse.   Gastrointestinal: BS+, soft, NT/ND  Extremities: No peripheral edema  Vascular: 2+ peripheral pulses  Neurological: A/O x 3, CN V-XII grossly intact.  Psychiatric: Normal mood, normal affect  Musculoskeletal: 5/5 strength b/l upper and lower extremities  Skin: No rashes      MEDICATIONS  (STANDING):  lactated ringers. 1000 milliLiter(s) (200 mL/Hr) IV Continuous <Continuous>  magnesium sulfate  IVPB 2 Gram(s) IV Intermittent every 6 hours  multivitamin/thiamine/folic acid in sodium chloride 0.9% 1000 milliLiter(s) (100 mL/Hr) IV Continuous <Continuous>  senna 2 Tablet(s) Oral at bedtime  docusate sodium 100 milliGRAM(s) Oral two times a day    MEDICATIONS  (PRN):  HYDROmorphone  Injectable 0.5 milliGRAM(s) IV Push every 4 hours PRN Moderate Pain  HYDROmorphone  Injectable 1 milliGRAM(s) IV Push every 4 hours PRN Severe Pain  ondansetron Injectable 4 milliGRAM(s) IV Push every 6 hours PRN Nausea  metoclopramide Injectable 10 milliGRAM(s) IV Push every 6 hours PRN Nausea and/or Vomiting  acetaminophen   Tablet 650 milliGRAM(s) Oral every 6 hours PRN For Temp greater than 38 C (100.4 F)      Allergies    No Known Allergies    Intolerances        LABS:                        10.5   8.3   )-----------( 181      ( 31 Aug 2017 07:11 )             30.6     09-01    132<L>  |  96  |  6<L>  ----------------------------<  103<H>  3.9   |  24  |  0.80    Ca    8.0<L>      01 Sep 2017 07:30  Phos  2.4     09-01  Mg     1.5     09-01    TPro  6.5  /  Alb  2.5<L>  /  TBili  3.1<H>  /  DBili  1.5<H>  /  AST  65<H>  /  ALT  59<H>  /  AlkPhos  140<H>  09-01          RADIOLOGY & ADDITIONAL TESTS: PGY1 Transfer Acceptance Note     Hospital Course:   52M from [facility] presenting with 1 month hx of R-sided abdominal pain, PO intolerance, NBNB vomiting, found to have hyperbilirubinemia and significantly dilated GB on CT A/P and U/S.  On night of admission patient had an MRCP done which showed distended gallbladder with no biliary dilation. The following morning the patients abdominal pain had improved and hepatitis panel was negative. GI was consulted and a HIDA scan was done, which showed an overall normal gallbladder although it was long in length. GI  had concerns regarding the patient's presentation and report of weight loss and decided to follow up with ERCP with sphincterotomy, revealing acalculous cholecystitis.  Patient was on IVF and Banana bag during his stay.  Patient was initially on a low fat diet and then was made NPO for ERCP. Following procedure, patient had elevated lipase and amylase consistent with possible post-ERCP pancreatitis as well as new abdominal pain/distension and a temperature of 101.5F (tylenol was given).  Patient was transferred on Sep 1 to [facility] from [facility] [place].     VITAL SIGNS:  T(F): 100.4 (09-01-17 @ 15:51)  HR: 100 (09-01-17 @ 15:51)  BP: 130/76 (09-01-17 @ 15:51)  RR: 16 (09-01-17 @ 15:51)  SpO2: 95% (09-01-17 @ 15:51)  Wt(kg): --    PHYSICAL EXAM:  Constitutional: Patient seated in bed, sweating and uncomfortable.   HEENT: PERRLA, Normal Range of eye movement with no complaint of diplopia, Normal Hearing  Neck: No LAD, No JVD  Back: Normal spine flexure, No CVA tenderness  Respiratory: Normal air entry, Lungs clear to auscultation b/l w/o wheeze or crepitations.   Cardiovascular: S1 and S2 present - no additional abnormal sounds or murmurs. Normal rhythm - pulse is tachycardic.   Gastrointestinal: BS+. Abdomen is tender to palpation in all quadrants with increased sensitivity in the RLQ. +Guarding with palpation in the RLQ. Abdomen is distended.   Extremities: No peripheral edema  Vascular: 2+ peripheral pulses  Neurological: A/O x 3, CN V-XII grossly intact.  Musculoskeletal: 5/5 strength b/l upper and lower extremities  Skin: No rashes      MEDICATIONS  (STANDING):  lactated ringers. 1000 milliLiter(s) (200 mL/Hr) IV Continuous <Continuous>  magnesium sulfate  IVPB 2 Gram(s) IV Intermittent every 6 hours  multivitamin/thiamine/folic acid in sodium chloride 0.9% 1000 milliLiter(s) (100 mL/Hr) IV Continuous <Continuous>  senna 2 Tablet(s) Oral at bedtime  docusate sodium 100 milliGRAM(s) Oral two times a day    MEDICATIONS  (PRN):  HYDROmorphone  Injectable 0.5 milliGRAM(s) IV Push every 4 hours PRN Moderate Pain  HYDROmorphone  Injectable 1 milliGRAM(s) IV Push every 4 hours PRN Severe Pain  ondansetron Injectable 4 milliGRAM(s) IV Push every 6 hours PRN Nausea  metoclopramide Injectable 10 milliGRAM(s) IV Push every 6 hours PRN Nausea and/or Vomiting  acetaminophen   Tablet 650 milliGRAM(s) Oral every 6 hours PRN For Temp greater than 38 C (100.4 F)      Allergies    No Known Allergies    Intolerances        LABS:                        10.5   8.3   )-----------( 181      ( 31 Aug 2017 07:11 )             30.6     09-01    132<L>  |  96  |  6<L>  ----------------------------<  103<H>  3.9   |  24  |  0.80    Ca    8.0<L>      01 Sep 2017 07:30  Phos  2.4     09-01  Mg     1.5     09-01    TPro  6.5  /  Alb  2.5<L>  /  TBili  3.1<H>  /  DBili  1.5<H>  /  AST  65<H>  /  ALT  59<H>  /  AlkPhos  140<H>  09-01

## 2017-09-01 NOTE — PROGRESS NOTE ADULT - ASSESSMENT
Post ERCP pancreatitis To give analgesics IV fluids and NPO  Await repeat am bloods Will discuss with surgery

## 2017-09-01 NOTE — PROGRESS NOTE ADULT - SUBJECTIVE AND OBJECTIVE BOX
Pt seen and examined at bedside. Pt states that at approximately 3 AM he had severe abdominal pain and felt the need to have a BM. He went to the bathroom, however, was unable to go. And in the AM had a temperature of 101.4. Pt states that after the ERCP he had a BM, however, since last night has been constipated and has not passed flatus.     Allergies  No Known Allergies    MEDICATIONS:  MEDICATIONS  (STANDING):  lactated ringers. 1000 milliLiter(s) (200 mL/Hr) IV Continuous <Continuous>  magnesium sulfate  IVPB 2 Gram(s) IV Intermittent every 6 hours  multivitamin/thiamine/folic acid in sodium chloride 0.9% 1000 milliLiter(s) (100 mL/Hr) IV Continuous <Continuous>    MEDICATIONS  (PRN):  HYDROmorphone  Injectable 0.5 milliGRAM(s) IV Push every 4 hours PRN Moderate Pain  HYDROmorphone  Injectable 1 milliGRAM(s) IV Push every 4 hours PRN Severe Pain  ondansetron Injectable 4 milliGRAM(s) IV Push every 6 hours PRN Nausea  metoclopramide Injectable 10 milliGRAM(s) IV Push every 6 hours PRN Nausea and/or Vomiting    Vital Signs Last 24 Hrs  T(C): 36.7 (01 Sep 2017 11:45), Max: 38.6 (01 Sep 2017 05:39)  T(F): 98 (01 Sep 2017 11:45), Max: 101.4 (01 Sep 2017 05:39)  HR: 88 (01 Sep 2017 11:45) (66 - 88)  BP: 110/72 (01 Sep 2017 11:45) (99/65 - 133/90)  BP(mean): --  RR: 16 (01 Sep 2017 11:45) (15 - 17)  SpO2: 97% (01 Sep 2017 11:45) (96% - 99%)    08-31 @ 07:01 - 09-01 @ 07:00  --------------------------------------------------------  IN: 2540 mL / OUT: 500 mL / NET: 2040 mL    09-01 @ 07:01 - 09-01 @ 13:49  --------------------------------------------------------  IN: 400 mL / OUT: 0 mL / NET: 400 mL    PHYSICAL EXAM:    General: Well developed; well nourished; in no acute distress; ambulating from bathroom to bed  HEENT: MMM, conjunctiva and sclera clear  Gastrointestinal: Soft, mild distention, diffuse TTP, greatest in the epigastric region with involuntary guarding  Skin: Warm and dry. No obvious rash    LABS:  CBC Full  -  ( 31 Aug 2017 07:11 )  WBC Count : 8.3 K/uL  Hemoglobin : 10.5 g/dL  Hematocrit : 30.6 %  Platelet Count - Automated : 181 K/uL  Mean Cell Volume : 89.0 fL  Mean Cell Hemoglobin : 30.5 pg  Mean Cell Hemoglobin Concentration : 34.3 g/dL    09-01    132<L>  |  96  |  6<L>  ----------------------------<  103<H>  3.9   |  24  |  0.80    Ca    8.0<L>      01 Sep 2017 07:30  Phos  2.4     09-01  Mg     1.5     09-01    TPro  6.5  /  Alb  2.5<L>  /  TBili  3.1<H>  /  DBili  1.5<H>  /  AST  65<H>  /  ALT  59<H>  /  AlkPhos  140<H>  09-01    RADIOLOGY & ADDITIONAL STUDIES: No new radiologic studies

## 2017-09-01 NOTE — PROGRESS NOTE ADULT - ASSESSMENT
52M with a significantly dilated GB and hyperbilirubinemia.    s/p ERCP (8/31) and  improved abdominal pain from yesterday afternoon 2/2 to post ERCP pancreatitis    - No current indication for abx as this has been ongoing for 1 month and pt has not developed any leukocytosis and/or fever.  - Advanced to clear diet  - SQH/SCDs/OOBA  - f/u ova/parasite stool studies   - Pain/nausea control PRN  - Banana bag daily  - Ativan PRN for s/s of ETOH withdrawal  - ERCP in AM    Evaluate for tolerance of clear diet. If no abdominal pain and labs normal, discharge in afternoon and instruct to follow up with Dr. Russell scheduling of elective lap sean. 52M with a significantly dilated GB and hyperbilirubinemia.    s/p ERCP (8/31) and  improved abdominal pain from yesterday afternoon 2/2 to post ERCP pancreatitis    - No current indication for abx as this has been ongoing for 1 month and pt has not developed any leukocytosis and/or fever.  - NPO for possbile pending surgical intervention  - increase IVF to 200cc/hr, and 500 cc bolus given  - SQH/SCDs/OOBA  - f/u ova/parasite stool studies   - Pain/nausea control PRN  - Banana bag daily  - Ativan PRN for s/s of ETOH withdrawal

## 2017-09-01 NOTE — PROGRESS NOTE ADULT - ASSESSMENT
51 YO [place] M with no significant past medical history p/w abdominal pain, nausea, and vomiting x 1 month found to have a distended gallbladder with cholestasis.    Gallbladder distension 2/2 acalculous cholecystitis c/b possible post-ERCP pancreatitis  - s/p ERCP with sphincterotomy with acalculous cholecystitis  - Lipase now trending down, pt denies nausea or vomiting and has been tolerating a liquid diet, however, febrile up to 101.4 in AM  - Recommend checking blood cultures, CXR, and UA  - Abdominal pain may be 2/2 ileus as pt has been receiving Dilaudid, recommend avoiding narcotics if possible  - Check upright abdominal XR  - Monitor serial abdominal exam  - LFTs trending down, continue to monitor daily LFTs  - F/u stool studies    Case d/w Dr. Rogers

## 2017-09-02 LAB
ALBUMIN SERPL ELPH-MCNC: 2.1 G/DL — LOW (ref 3.3–5)
ALP SERPL-CCNC: 120 U/L — SIGNIFICANT CHANGE UP (ref 40–120)
ALT FLD-CCNC: 41 U/L — SIGNIFICANT CHANGE UP (ref 10–45)
AMYLASE P1 CFR SERPL: 48 U/L — SIGNIFICANT CHANGE UP (ref 25–125)
ANION GAP SERPL CALC-SCNC: 8 MMOL/L — SIGNIFICANT CHANGE UP (ref 5–17)
APPEARANCE UR: CLEAR — SIGNIFICANT CHANGE UP
AST SERPL-CCNC: 43 U/L — HIGH (ref 10–40)
BASOPHILS NFR BLD AUTO: 0.3 % — SIGNIFICANT CHANGE UP (ref 0–2)
BILIRUB DIRECT SERPL-MCNC: 1.4 MG/DL — HIGH (ref 0–0.2)
BILIRUB INDIRECT FLD-MCNC: 1.5 MG/DL — HIGH (ref 0.2–1)
BILIRUB SERPL-MCNC: 2.9 MG/DL — HIGH (ref 0.2–1.2)
BILIRUB SERPL-MCNC: 2.9 MG/DL — HIGH (ref 0.2–1.2)
BILIRUB UR-MCNC: NEGATIVE — SIGNIFICANT CHANGE UP
BUN SERPL-MCNC: 6 MG/DL — LOW (ref 7–23)
CALCIUM SERPL-MCNC: 8.2 MG/DL — LOW (ref 8.4–10.5)
CHLORIDE SERPL-SCNC: 98 MMOL/L — SIGNIFICANT CHANGE UP (ref 96–108)
CO2 SERPL-SCNC: 27 MMOL/L — SIGNIFICANT CHANGE UP (ref 22–31)
COLOR SPEC: YELLOW — SIGNIFICANT CHANGE UP
CREAT SERPL-MCNC: 0.8 MG/DL — SIGNIFICANT CHANGE UP (ref 0.5–1.3)
CULTURE RESULTS: SIGNIFICANT CHANGE UP
CULTURE RESULTS: SIGNIFICANT CHANGE UP
DIFF PNL FLD: NEGATIVE — SIGNIFICANT CHANGE UP
EOSINOPHIL NFR BLD AUTO: 0.3 % — SIGNIFICANT CHANGE UP (ref 0–6)
EPI CELLS # UR: SIGNIFICANT CHANGE UP /HPF
GLUCOSE SERPL-MCNC: 110 MG/DL — HIGH (ref 70–99)
GLUCOSE UR QL: NEGATIVE — SIGNIFICANT CHANGE UP
HCT VFR BLD CALC: 26.1 % — LOW (ref 39–50)
HGB BLD-MCNC: 8.8 G/DL — LOW (ref 13–17)
KETONES UR-MCNC: NEGATIVE — SIGNIFICANT CHANGE UP
LEUKOCYTE ESTERASE UR-ACNC: NEGATIVE — SIGNIFICANT CHANGE UP
LYMPHOCYTES # BLD AUTO: 16.1 % — SIGNIFICANT CHANGE UP (ref 13–44)
MAGNESIUM SERPL-MCNC: 1.9 MG/DL — SIGNIFICANT CHANGE UP (ref 1.6–2.6)
MCHC RBC-ENTMCNC: 31.7 PG — SIGNIFICANT CHANGE UP (ref 27–34)
MCHC RBC-ENTMCNC: 33.7 G/DL — SIGNIFICANT CHANGE UP (ref 32–36)
MCV RBC AUTO: 93.9 FL — SIGNIFICANT CHANGE UP (ref 80–100)
MONOCYTES NFR BLD AUTO: 4.8 % — SIGNIFICANT CHANGE UP (ref 2–14)
NEUTROPHILS NFR BLD AUTO: 78.5 % — HIGH (ref 43–77)
NITRITE UR-MCNC: NEGATIVE — SIGNIFICANT CHANGE UP
ORGANISM # SPEC MICROSCOPIC CNT: SIGNIFICANT CHANGE UP
ORGANISM # SPEC MICROSCOPIC CNT: SIGNIFICANT CHANGE UP
PH UR: 8 — SIGNIFICANT CHANGE UP (ref 5–8)
PHOSPHATE SERPL-MCNC: 3.5 MG/DL — SIGNIFICANT CHANGE UP (ref 2.5–4.5)
PLATELET # BLD AUTO: 156 K/UL — SIGNIFICANT CHANGE UP (ref 150–400)
POTASSIUM SERPL-MCNC: 4 MMOL/L — SIGNIFICANT CHANGE UP (ref 3.5–5.3)
POTASSIUM SERPL-SCNC: 4 MMOL/L — SIGNIFICANT CHANGE UP (ref 3.5–5.3)
PROT SERPL-MCNC: 5.5 G/DL — LOW (ref 6–8.3)
PROT UR-MCNC: (no result) MG/DL
RBC # BLD: 2.78 M/UL — LOW (ref 4.2–5.8)
RBC # FLD: 16.1 % — SIGNIFICANT CHANGE UP (ref 10.3–16.9)
SODIUM SERPL-SCNC: 133 MMOL/L — LOW (ref 135–145)
SP GR SPEC: 1.01 — SIGNIFICANT CHANGE UP (ref 1–1.03)
SPECIMEN SOURCE: SIGNIFICANT CHANGE UP
SPECIMEN SOURCE: SIGNIFICANT CHANGE UP
UROBILINOGEN FLD QL: >=8 E.U./DL
WBC # BLD: 12.2 K/UL — HIGH (ref 3.8–10.5)
WBC # FLD AUTO: 12.2 K/UL — HIGH (ref 3.8–10.5)
WBC UR QL: < 5 /HPF — SIGNIFICANT CHANGE UP

## 2017-09-02 PROCEDURE — 74176 CT ABD & PELVIS W/O CONTRAST: CPT | Mod: 26

## 2017-09-02 RX ORDER — ACETAMINOPHEN 500 MG
650 TABLET ORAL ONCE
Qty: 0 | Refills: 0 | Status: COMPLETED | OUTPATIENT
Start: 2017-09-02 | End: 2017-09-02

## 2017-09-02 RX ORDER — ACETAMINOPHEN 500 MG
500 TABLET ORAL ONCE
Qty: 0 | Refills: 0 | Status: COMPLETED | OUTPATIENT
Start: 2017-09-02 | End: 2017-09-02

## 2017-09-02 RX ADMIN — Medication 500 MILLIGRAM(S): at 11:30

## 2017-09-02 RX ADMIN — Medication 650 MILLIGRAM(S): at 06:16

## 2017-09-02 RX ADMIN — AMPICILLIN SODIUM AND SULBACTAM SODIUM 100 GRAM(S): 250; 125 INJECTION, POWDER, FOR SUSPENSION INTRAMUSCULAR; INTRAVENOUS at 11:51

## 2017-09-02 RX ADMIN — HYDROMORPHONE HYDROCHLORIDE 1 MILLIGRAM(S): 2 INJECTION INTRAMUSCULAR; INTRAVENOUS; SUBCUTANEOUS at 18:48

## 2017-09-02 RX ADMIN — HYDROMORPHONE HYDROCHLORIDE 0.5 MILLIGRAM(S): 2 INJECTION INTRAMUSCULAR; INTRAVENOUS; SUBCUTANEOUS at 22:36

## 2017-09-02 RX ADMIN — Medication 650 MILLIGRAM(S): at 00:53

## 2017-09-02 RX ADMIN — HYDROMORPHONE HYDROCHLORIDE 0.5 MILLIGRAM(S): 2 INJECTION INTRAMUSCULAR; INTRAVENOUS; SUBCUTANEOUS at 14:27

## 2017-09-02 RX ADMIN — AMPICILLIN SODIUM AND SULBACTAM SODIUM 100 GRAM(S): 250; 125 INJECTION, POWDER, FOR SUSPENSION INTRAMUSCULAR; INTRAVENOUS at 00:19

## 2017-09-02 RX ADMIN — AMPICILLIN SODIUM AND SULBACTAM SODIUM 100 GRAM(S): 250; 125 INJECTION, POWDER, FOR SUSPENSION INTRAMUSCULAR; INTRAVENOUS at 17:43

## 2017-09-02 RX ADMIN — HYDROMORPHONE HYDROCHLORIDE 1 MILLIGRAM(S): 2 INJECTION INTRAMUSCULAR; INTRAVENOUS; SUBCUTANEOUS at 05:54

## 2017-09-02 RX ADMIN — HYDROMORPHONE HYDROCHLORIDE 1 MILLIGRAM(S): 2 INJECTION INTRAMUSCULAR; INTRAVENOUS; SUBCUTANEOUS at 18:30

## 2017-09-02 RX ADMIN — SENNA PLUS 2 TABLET(S): 8.6 TABLET ORAL at 00:20

## 2017-09-02 RX ADMIN — SODIUM CHLORIDE 200 MILLILITER(S): 9 INJECTION, SOLUTION INTRAVENOUS at 08:16

## 2017-09-02 RX ADMIN — SODIUM CHLORIDE 200 MILLILITER(S): 9 INJECTION, SOLUTION INTRAVENOUS at 22:21

## 2017-09-02 RX ADMIN — Medication 100 MILLIGRAM(S): at 05:39

## 2017-09-02 RX ADMIN — Medication 500 MILLIGRAM(S): at 10:30

## 2017-09-02 RX ADMIN — Medication 650 MILLIGRAM(S): at 07:04

## 2017-09-02 RX ADMIN — SODIUM CHLORIDE 200 MILLILITER(S): 9 INJECTION, SOLUTION INTRAVENOUS at 17:44

## 2017-09-02 RX ADMIN — HYDROMORPHONE HYDROCHLORIDE 0.5 MILLIGRAM(S): 2 INJECTION INTRAMUSCULAR; INTRAVENOUS; SUBCUTANEOUS at 14:45

## 2017-09-02 RX ADMIN — Medication 100 MILLIGRAM(S): at 17:44

## 2017-09-02 RX ADMIN — SODIUM CHLORIDE 200 MILLILITER(S): 9 INJECTION, SOLUTION INTRAVENOUS at 13:50

## 2017-09-02 RX ADMIN — HYDROMORPHONE HYDROCHLORIDE 1 MILLIGRAM(S): 2 INJECTION INTRAMUSCULAR; INTRAVENOUS; SUBCUTANEOUS at 05:39

## 2017-09-02 RX ADMIN — AMPICILLIN SODIUM AND SULBACTAM SODIUM 100 GRAM(S): 250; 125 INJECTION, POWDER, FOR SUSPENSION INTRAMUSCULAR; INTRAVENOUS at 22:21

## 2017-09-02 RX ADMIN — SENNA PLUS 2 TABLET(S): 8.6 TABLET ORAL at 22:21

## 2017-09-02 RX ADMIN — AMPICILLIN SODIUM AND SULBACTAM SODIUM 100 GRAM(S): 250; 125 INJECTION, POWDER, FOR SUSPENSION INTRAMUSCULAR; INTRAVENOUS at 05:39

## 2017-09-02 RX ADMIN — HYDROMORPHONE HYDROCHLORIDE 0.5 MILLIGRAM(S): 2 INJECTION INTRAMUSCULAR; INTRAVENOUS; SUBCUTANEOUS at 10:20

## 2017-09-02 RX ADMIN — HYDROMORPHONE HYDROCHLORIDE 0.5 MILLIGRAM(S): 2 INJECTION INTRAMUSCULAR; INTRAVENOUS; SUBCUTANEOUS at 22:21

## 2017-09-02 RX ADMIN — HYDROMORPHONE HYDROCHLORIDE 0.5 MILLIGRAM(S): 2 INJECTION INTRAMUSCULAR; INTRAVENOUS; SUBCUTANEOUS at 10:35

## 2017-09-02 NOTE — PROGRESS NOTE ADULT - SUBJECTIVE AND OBJECTIVE BOX
Pt seen and examined   No new c/o  still has some epigastric pain, but otherwise better than yesterday  tolerating liquid diet    REVIEW OF SYSTEMS:  Constitutional: No fever, weight loss or fatigue  Cardiovascular: No chest pain, palpitations, dizziness or leg swelling  Gastrointestinal: +abdominal pain. No nausea, vomiting or hematemesis; No diarrhea or constipation. No melena or hematochezia.  Skin: No itching, burning, rashes or lesions       MEDICATIONS:  MEDICATIONS  (STANDING):  lactated ringers. 1000 milliLiter(s) (200 mL/Hr) IV Continuous <Continuous>  multivitamin/thiamine/folic acid in sodium chloride 0.9% 1000 milliLiter(s) (100 mL/Hr) IV Continuous <Continuous>  senna 2 Tablet(s) Oral at bedtime  docusate sodium 100 milliGRAM(s) Oral two times a day  ampicillin/sulbactam  IVPB 1.5 Gram(s) IV Intermittent every 6 hours  ampicillin/sulbactam  IVPB   IV Intermittent     MEDICATIONS  (PRN):  HYDROmorphone  Injectable 0.5 milliGRAM(s) IV Push every 4 hours PRN Moderate Pain  HYDROmorphone  Injectable 1 milliGRAM(s) IV Push every 4 hours PRN Severe Pain  ondansetron Injectable 4 milliGRAM(s) IV Push every 6 hours PRN Nausea  metoclopramide Injectable 10 milliGRAM(s) IV Push every 6 hours PRN Nausea and/or Vomiting  acetaminophen   Tablet 650 milliGRAM(s) Oral every 6 hours PRN For Temp greater than 38 C (100.4 F)      Allergies  No Known Allergies    Intolerances        Vital Signs Last 24 Hrs  T(C): 36.7 (02 Sep 2017 10:14), Max: 38 (01 Sep 2017 15:51)  T(F): 98 (02 Sep 2017 10:14), Max: 100.4 (01 Sep 2017 15:51)  HR: 83 (02 Sep 2017 10:14) (83 - 100)  BP: 110/75 (02 Sep 2017 10:14) (110/75 - 130/76)  BP(mean): --  RR: 16 (02 Sep 2017 10:14) (16 - 18)  SpO2: 98% (02 Sep 2017 10:14) (95% - 98%)    09-01 @ 07:01  -  09-02 @ 07:00  --------------------------------------------------------  IN: 5800 mL / OUT: 0 mL / NET: 5800 mL        PHYSICAL EXAM:    General: Well developed; well nourished; in no acute distress  HEENT: MMM, conjunctiva and sclera clear  Gastrointestinal: Soft, mild distention, diffuse TTP, greatest in the epigastric region with involuntary guarding  Skin: Warm and dry. No obvious rash    LABS:  CBC Full  -  ( 02 Sep 2017 07:28 )  WBC Count : 12.2 K/uL  Hemoglobin : 8.8 g/dL  Hematocrit : 26.1 %  Platelet Count - Automated : 156 K/uL  Mean Cell Volume : 93.9 fL  Mean Cell Hemoglobin : 31.7 pg  Mean Cell Hemoglobin Concentration : 33.7 g/dL  Auto Neutrophil # : x  Auto Lymphocyte # : x  Auto Monocyte # : x  Auto Eosinophil # : x  Auto Basophil # : x  Auto Neutrophil % : x  Auto Lymphocyte % : x  Auto Monocyte % : x  Auto Eosinophil % : x  Auto Basophil % : x    09-02    133<L>  |  98  |  6<L>  ----------------------------<  110<H>  4.0   |  27  |  0.80    Ca    8.2<L>      02 Sep 2017 07:28  Phos  3.5     09-02  Mg     1.9     09-02    TPro  5.5<L>  /  Alb  2.1<L>  /  TBili  2.9<H>  /  DBili  1.4<H>  /  AST  43<H>  /  ALT  41  /  AlkPhos  120  09-02                      RADIOLOGY & ADDITIONAL STUDIES (The following images were personally reviewed):    CT Abdomen and Pelvis w/ Oral Cont (09.02.17 @ 00:14)   IMPRESSION:     Normal development of acute pancreatitis.  Normal size gallbladder.  Wall thickening of duodenum probably secondary to surrounding inflammation. No evidence of bowel perforation.  Bulging papilla could be secondary to recent ERCP or acute pancreatitis.   Trace bilateral pleural effusions.

## 2017-09-02 NOTE — PROGRESS NOTE ADULT - ASSESSMENT
53 YO [place] M with no significant past medical history p/w abdominal pain, nausea, and vomiting x 1 month found to have a distended gallbladder with cholestasis.    # Gallbladder distension 2/2 acalculous cholecystitis   - s/p ERCP with sphincterotomy with acalculous cholecystitis  - c/b possible post-ERCP pancreatitis  - Lipase now trending down, pt denies nausea or vomiting and has been tolerating a liquid diet  - CT A/P - negative for perforation  - Blood cx - neg to date  - Abdominal pain could be due to pancreatitis vs ileus (narcotic induced) - Monitor serial abdominal exam   - LFTs trending down, continue to monitor daily LFTs  - F/u stool studies if sent      Discussed with attending Dr Sue SIMS following 53 YO [place]  with no significant past medical history p/w abdominal pain, nausea, and vomiting x 1 month found to have a distended gallbladder with cholestasis.    # Gallbladder distension 2/2 acalculous cholecystitis   - s/p ERCP with sphincterotomy with acalculous cholecystitis - now gall bladder size is normal  - c/b possible post-ERCP pancreatitis  - has been tolerating a liquid diet - can advance as tolerated  - CT A/P - negative for perforation, but consistent with pancreatitis and duodenal wall thickening  - Blood cx - neg to date  - Abdominal pain could be due to pancreatitis vs ileus (narcotic induced) - Monitor serial abdominal exam   - try to avoid narcotics if possible  - Lipase and LFTs trending down, continue to monitor daily LFTs  - F/u stool studies if sent      Discussed with attending Dr Sue SIMS following

## 2017-09-03 LAB
ANION GAP SERPL CALC-SCNC: 10 MMOL/L — SIGNIFICANT CHANGE UP (ref 5–17)
BUN SERPL-MCNC: 4 MG/DL — LOW (ref 7–23)
CALCIUM SERPL-MCNC: 8.3 MG/DL — LOW (ref 8.4–10.5)
CHLORIDE SERPL-SCNC: 93 MMOL/L — LOW (ref 96–108)
CO2 SERPL-SCNC: 26 MMOL/L — SIGNIFICANT CHANGE UP (ref 22–31)
CREAT SERPL-MCNC: 0.8 MG/DL — SIGNIFICANT CHANGE UP (ref 0.5–1.3)
GLUCOSE SERPL-MCNC: 106 MG/DL — HIGH (ref 70–99)
HCT VFR BLD CALC: 24.2 % — LOW (ref 39–50)
HGB BLD-MCNC: 8 G/DL — LOW (ref 13–17)
MCHC RBC-ENTMCNC: 31.5 PG — SIGNIFICANT CHANGE UP (ref 27–34)
MCHC RBC-ENTMCNC: 33.1 G/DL — SIGNIFICANT CHANGE UP (ref 32–36)
MCV RBC AUTO: 95.3 FL — SIGNIFICANT CHANGE UP (ref 80–100)
PLATELET # BLD AUTO: 167 K/UL — SIGNIFICANT CHANGE UP (ref 150–400)
POTASSIUM SERPL-MCNC: 4 MMOL/L — SIGNIFICANT CHANGE UP (ref 3.5–5.3)
POTASSIUM SERPL-SCNC: 4 MMOL/L — SIGNIFICANT CHANGE UP (ref 3.5–5.3)
RBC # BLD: 2.54 M/UL — LOW (ref 4.2–5.8)
RBC # FLD: 15.2 % — SIGNIFICANT CHANGE UP (ref 10.3–16.9)
SODIUM SERPL-SCNC: 129 MMOL/L — LOW (ref 135–145)
WBC # BLD: 11.9 K/UL — HIGH (ref 3.8–10.5)
WBC # FLD AUTO: 11.9 K/UL — HIGH (ref 3.8–10.5)

## 2017-09-03 RX ORDER — POLYETHYLENE GLYCOL 3350 17 G/17G
17 POWDER, FOR SOLUTION ORAL ONCE
Qty: 0 | Refills: 0 | Status: COMPLETED | OUTPATIENT
Start: 2017-09-03 | End: 2017-09-03

## 2017-09-03 RX ORDER — ACETAMINOPHEN 500 MG
650 TABLET ORAL ONCE
Qty: 0 | Refills: 0 | Status: COMPLETED | OUTPATIENT
Start: 2017-09-03 | End: 2017-09-03

## 2017-09-03 RX ADMIN — SODIUM CHLORIDE 200 MILLILITER(S): 9 INJECTION, SOLUTION INTRAVENOUS at 10:28

## 2017-09-03 RX ADMIN — POLYETHYLENE GLYCOL 3350 17 GRAM(S): 17 POWDER, FOR SOLUTION ORAL at 23:12

## 2017-09-03 RX ADMIN — Medication 100 MILLIGRAM(S): at 05:56

## 2017-09-03 RX ADMIN — SODIUM CHLORIDE 200 MILLILITER(S): 9 INJECTION, SOLUTION INTRAVENOUS at 21:10

## 2017-09-03 RX ADMIN — AMPICILLIN SODIUM AND SULBACTAM SODIUM 100 GRAM(S): 250; 125 INJECTION, POWDER, FOR SUSPENSION INTRAMUSCULAR; INTRAVENOUS at 23:07

## 2017-09-03 RX ADMIN — AMPICILLIN SODIUM AND SULBACTAM SODIUM 100 GRAM(S): 250; 125 INJECTION, POWDER, FOR SUSPENSION INTRAMUSCULAR; INTRAVENOUS at 10:33

## 2017-09-03 RX ADMIN — AMPICILLIN SODIUM AND SULBACTAM SODIUM 100 GRAM(S): 250; 125 INJECTION, POWDER, FOR SUSPENSION INTRAMUSCULAR; INTRAVENOUS at 05:56

## 2017-09-03 RX ADMIN — HYDROMORPHONE HYDROCHLORIDE 1 MILLIGRAM(S): 2 INJECTION INTRAMUSCULAR; INTRAVENOUS; SUBCUTANEOUS at 13:03

## 2017-09-03 RX ADMIN — HYDROMORPHONE HYDROCHLORIDE 0.5 MILLIGRAM(S): 2 INJECTION INTRAMUSCULAR; INTRAVENOUS; SUBCUTANEOUS at 05:56

## 2017-09-03 RX ADMIN — Medication 650 MILLIGRAM(S): at 06:55

## 2017-09-03 RX ADMIN — AMPICILLIN SODIUM AND SULBACTAM SODIUM 100 GRAM(S): 250; 125 INJECTION, POWDER, FOR SUSPENSION INTRAMUSCULAR; INTRAVENOUS at 17:07

## 2017-09-03 RX ADMIN — SODIUM CHLORIDE 200 MILLILITER(S): 9 INJECTION, SOLUTION INTRAVENOUS at 17:01

## 2017-09-03 RX ADMIN — HYDROMORPHONE HYDROCHLORIDE 1 MILLIGRAM(S): 2 INJECTION INTRAMUSCULAR; INTRAVENOUS; SUBCUTANEOUS at 19:44

## 2017-09-03 RX ADMIN — HYDROMORPHONE HYDROCHLORIDE 1 MILLIGRAM(S): 2 INJECTION INTRAMUSCULAR; INTRAVENOUS; SUBCUTANEOUS at 02:45

## 2017-09-03 RX ADMIN — HYDROMORPHONE HYDROCHLORIDE 1 MILLIGRAM(S): 2 INJECTION INTRAMUSCULAR; INTRAVENOUS; SUBCUTANEOUS at 13:15

## 2017-09-03 RX ADMIN — HYDROMORPHONE HYDROCHLORIDE 0.5 MILLIGRAM(S): 2 INJECTION INTRAMUSCULAR; INTRAVENOUS; SUBCUTANEOUS at 06:11

## 2017-09-03 RX ADMIN — Medication 100 MILLIGRAM(S): at 17:07

## 2017-09-03 RX ADMIN — HYDROMORPHONE HYDROCHLORIDE 1 MILLIGRAM(S): 2 INJECTION INTRAMUSCULAR; INTRAVENOUS; SUBCUTANEOUS at 03:00

## 2017-09-03 RX ADMIN — SENNA PLUS 2 TABLET(S): 8.6 TABLET ORAL at 21:10

## 2017-09-03 RX ADMIN — SODIUM CHLORIDE 200 MILLILITER(S): 9 INJECTION, SOLUTION INTRAVENOUS at 02:45

## 2017-09-03 RX ADMIN — HYDROMORPHONE HYDROCHLORIDE 1 MILLIGRAM(S): 2 INJECTION INTRAMUSCULAR; INTRAVENOUS; SUBCUTANEOUS at 20:00

## 2017-09-03 RX ADMIN — Medication 650 MILLIGRAM(S): at 06:25

## 2017-09-03 NOTE — PROGRESS NOTE ADULT - PROBLEM SELECTOR PLAN 1
Patient presented with R-sided abdominal pain, PO intolerance, NBNB vomiting and was found to have raised bilirubin on admission. Patient had numerous imaging studies incl U/S/MRI/HIDA, all showing thickened gallbladder wall, without dilation of CBD or presence of stones. Patient underwent MRCP and ERCP w/ sphincterotomy. Condition believed to be due to Acalculous Cholecystitis.
Patient presented with R-sided abdominal pain, PO intolerance, NBNB vomiting and was found to have raised bilirubin on admission. Patient had numerous imaging studies incl U/S/MRI/HIDA, all showing thickened gallbladder wall, without dilation of CBD or presence of stones. Patient underwent MRCP and ERCP w/ sphincterotomy. Condition believed to be due to Acalculous Cholecystitis.

## 2017-09-03 NOTE — PROGRESS NOTE ADULT - SUBJECTIVE AND OBJECTIVE BOX
Tolerating po fluids  Still with epigastric pain No BM VS stable Afeb Icterus is less grossly In RR Clear chest Abd is soft with BS

## 2017-09-03 NOTE — PROGRESS NOTE ADULT - SUBJECTIVE AND OBJECTIVE BOX
Chief Complaint: RUQ Pain/Nausea and Vomiting - Acalculous Cholecystitis.     52M from [facility] presenting with 1 month hx of R-sided abdominal pain, PO intolerance, NBNB vomiting who underwent both MRCP and ERCP w/ sphincterotomy, illustrating acalculous cholecystitis, with course complicated by post-ERCP Pancreatitis.    INTERVAL HPI/OVERNIGHT EVENTS:  Overnight, patient complained of increased pain and felt RLQ was warm to touch an hour or so after his dinner. Patient has been slowly tolerating more food, is taking in regular meals. No further c/o fever/chills/rigors. Patient has not passed a BM since admission, but no c/o urinary symptoms.     Slight drop in Hgb (8.8 to 8.0) - Dr Capellan requested GI to weigh in regarding etiology. But, no evidence of bleeding.                      VITAL SIGNS:  T(F): 98.7 (17 @ 16:21)  HR: 63 (17 @ 16:21)  BP: 131/80 (17 @ 16:21)  RR: 16 (17 @ 16:21)  SpO2: 97% (17 @ 16:21)  Wt(kg): --    PHYSICAL EXAM:  Constitutional: Patient seated comfortably in bed, yellow-tinged skin consistent with jaundice.   HEENT: PERRLA, Normal Range of eye movement with no complaint of diplopia, Normal Hearing  Neck: No LAD, No JVD  Back: Normal spine flexure, No CVA tenderness  Respiratory: Normal air entry, Lungs clear to auscultation b/l w/o wheeze or crepitations.   Cardiovascular: S1 and S2 present - no additional abnormal sounds or murmurs. Normal rhythm and rate of pulse.   Gastrointestinal: BS+, Tender to palpation in epigastric region, mildly tender to palpation in RLQ. No visible distension. No shifting dullness/fluid thrill.   Extremities: No peripheral edema  Vascular: 2+ peripheral pulses  Neurological: A/O x 3, CN V-XII grossly intact.  Musculoskeletal: 5/5 strength b/l upper and lower extremities  Skin: No rashes      MEDICATIONS  (STANDING):  lactated ringers. 1000 milliLiter(s) (200 mL/Hr) IV Continuous <Continuous>  multivitamin/thiamine/folic acid in sodium chloride 0.9% 1000 milliLiter(s) (100 mL/Hr) IV Continuous <Continuous>  senna 2 Tablet(s) Oral at bedtime  docusate sodium 100 milliGRAM(s) Oral two times a day  ampicillin/sulbactam  IVPB 1.5 Gram(s) IV Intermittent every 6 hours  ampicillin/sulbactam  IVPB   IV Intermittent     MEDICATIONS  (PRN):  HYDROmorphone  Injectable 0.5 milliGRAM(s) IV Push every 4 hours PRN Moderate Pain  HYDROmorphone  Injectable 1 milliGRAM(s) IV Push every 4 hours PRN Severe Pain  ondansetron Injectable 4 milliGRAM(s) IV Push every 6 hours PRN Nausea  metoclopramide Injectable 10 milliGRAM(s) IV Push every 6 hours PRN Nausea and/or Vomiting  acetaminophen   Tablet 650 milliGRAM(s) Oral every 6 hours PRN For Temp greater than 38 C (100.4 F)      Allergies  No Known Allergies  Intolerances      LABS:                        8.0    11.9  )-----------( 167      ( 03 Sep 2017 06:56 )             24.2     -    129<L>  |  93<L>  |  4<L>  ----------------------------<  106<H>  4.0   |  26  |  0.80    Ca    8.3<L>      03 Sep 2017 06:56  Phos  3.5       Mg     1.9         TPro  5.5<L>  /  Alb  2.1<L>  /  TBili  2.9<H>  /  DBili  1.4<H>  /  AST  43<H>  /  ALT  41  /  AlkPhos  120        Urinalysis Basic - ( 02 Sep 2017 16:52 )    Color: Yellow / Appearance: Clear / S.010 / pH: x  Gluc: x / Ketone: NEGATIVE  / Bili: NEGATIVE / Urobili: >=8.0 E.U./dL   Blood: x / Protein: Trace mg/dL / Nitrite: NEGATIVE   Leuk Esterase: NEGATIVE / RBC: x / WBC < 5 /HPF   Sq Epi: x / Non Sq Epi: Rare /HPF / Bacteria: x        RADIOLOGY & ADDITIONAL TESTS:

## 2017-09-03 NOTE — PROGRESS NOTE ADULT - ASSESSMENT
52M from [facility] presenting with 1 month hx of R-sided abdominal pain, PO intolerance, NBNB vomiting who underwent both MRCP and ERCP w/ sphincterotomy, illustrating acalculous cholecystitis, with course complicated by post-ERCP Pancreatitis.

## 2017-09-03 NOTE — PROGRESS NOTE ADULT - PROBLEM SELECTOR PLAN 4
F: Ringer's Lactate 200cc/hr and NS w/ additives 100cc/hr   E: Replete PRN  N: Regular Diet  Dispo: RMF
F: Ringer's Lactate 200cc/hr and NS w/ additives 100cc/hr   E: Replete PRN  N: NPO till Xray, ordered for Clears  Dispo: RMF

## 2017-09-03 NOTE — PROGRESS NOTE ADULT - PROBLEM SELECTOR PLAN 3
Patient had a raised temperature and was tachycardic, meeting SIRS criteria.   - Blood cultures NGTD  - UA negative   -Abdominal X-ray was not concerning for perforation or obstruction.   - Patient is on Unasyn 1.5g q6.
Patient has a raised temperature and is tachycardic, meeting SIRS criteria.   -F/u UA - perform urine culture if concerning for infection   -F/u Abdominal X-ray results

## 2017-09-03 NOTE — PROGRESS NOTE ADULT - ASSESSMENT
Drop in Hct of unclear etiology GI to re evaluate May need gastroscopy Advance diet today as tolerated

## 2017-09-03 NOTE — PROGRESS NOTE ADULT - PROBLEM SELECTOR PLAN 2
-After ERCP, patient developed new abdominal pain and distension along with a raised lipase level. Bili/LFTs now downtrending.   - CT confirmed Post-ERCP Pancreatitis - fat stranding surrounding pancreas consistent with acute pancreatitis.  - C/w IVF, c/w Analgesia and anti-emetics.

## 2017-09-04 LAB
ANION GAP SERPL CALC-SCNC: 12 MMOL/L — SIGNIFICANT CHANGE UP (ref 5–17)
BILIRUB SERPL-MCNC: 1.4 MG/DL — HIGH (ref 0.2–1.2)
BUN SERPL-MCNC: 4 MG/DL — LOW (ref 7–23)
CALCIUM SERPL-MCNC: 8.4 MG/DL — SIGNIFICANT CHANGE UP (ref 8.4–10.5)
CHLORIDE SERPL-SCNC: 93 MMOL/L — LOW (ref 96–108)
CO2 SERPL-SCNC: 26 MMOL/L — SIGNIFICANT CHANGE UP (ref 22–31)
CREAT SERPL-MCNC: 0.7 MG/DL — SIGNIFICANT CHANGE UP (ref 0.5–1.3)
GLUCOSE SERPL-MCNC: 111 MG/DL — HIGH (ref 70–99)
HCT VFR BLD CALC: 23.1 % — LOW (ref 39–50)
HGB BLD-MCNC: 8 G/DL — LOW (ref 13–17)
MCHC RBC-ENTMCNC: 31.9 PG — SIGNIFICANT CHANGE UP (ref 27–34)
MCHC RBC-ENTMCNC: 34.6 G/DL — SIGNIFICANT CHANGE UP (ref 32–36)
MCV RBC AUTO: 92 FL — SIGNIFICANT CHANGE UP (ref 80–100)
PLATELET # BLD AUTO: 223 K/UL — SIGNIFICANT CHANGE UP (ref 150–400)
POTASSIUM SERPL-MCNC: 3.4 MMOL/L — LOW (ref 3.5–5.3)
POTASSIUM SERPL-SCNC: 3.4 MMOL/L — LOW (ref 3.5–5.3)
RBC # BLD: 2.51 M/UL — LOW (ref 4.2–5.8)
RBC # FLD: 15.5 % — SIGNIFICANT CHANGE UP (ref 10.3–16.9)
SODIUM SERPL-SCNC: 131 MMOL/L — LOW (ref 135–145)
WBC # BLD: 8.2 K/UL — SIGNIFICANT CHANGE UP (ref 3.8–10.5)
WBC # FLD AUTO: 8.2 K/UL — SIGNIFICANT CHANGE UP (ref 3.8–10.5)

## 2017-09-04 RX ORDER — POTASSIUM CHLORIDE 20 MEQ
40 PACKET (EA) ORAL ONCE
Qty: 0 | Refills: 0 | Status: COMPLETED | OUTPATIENT
Start: 2017-09-04 | End: 2017-09-04

## 2017-09-04 RX ORDER — LACTULOSE 10 G/15ML
20 SOLUTION ORAL ONCE
Qty: 0 | Refills: 0 | Status: COMPLETED | OUTPATIENT
Start: 2017-09-04 | End: 2017-09-04

## 2017-09-04 RX ORDER — ACETAMINOPHEN 500 MG
650 TABLET ORAL ONCE
Qty: 0 | Refills: 0 | Status: COMPLETED | OUTPATIENT
Start: 2017-09-04 | End: 2017-09-04

## 2017-09-04 RX ORDER — SODIUM CHLORIDE 9 MG/ML
1000 INJECTION INTRAMUSCULAR; INTRAVENOUS; SUBCUTANEOUS
Qty: 0 | Refills: 0 | Status: DISCONTINUED | OUTPATIENT
Start: 2017-09-04 | End: 2017-09-05

## 2017-09-04 RX ADMIN — Medication 100 MILLIGRAM(S): at 05:29

## 2017-09-04 RX ADMIN — HYDROMORPHONE HYDROCHLORIDE 0.5 MILLIGRAM(S): 2 INJECTION INTRAMUSCULAR; INTRAVENOUS; SUBCUTANEOUS at 05:39

## 2017-09-04 RX ADMIN — Medication 40 MILLIEQUIVALENT(S): at 09:51

## 2017-09-04 RX ADMIN — HYDROMORPHONE HYDROCHLORIDE 1 MILLIGRAM(S): 2 INJECTION INTRAMUSCULAR; INTRAVENOUS; SUBCUTANEOUS at 01:51

## 2017-09-04 RX ADMIN — SODIUM CHLORIDE 80 MILLILITER(S): 9 INJECTION INTRAMUSCULAR; INTRAVENOUS; SUBCUTANEOUS at 06:41

## 2017-09-04 RX ADMIN — HYDROMORPHONE HYDROCHLORIDE 0.5 MILLIGRAM(S): 2 INJECTION INTRAMUSCULAR; INTRAVENOUS; SUBCUTANEOUS at 18:00

## 2017-09-04 RX ADMIN — HYDROMORPHONE HYDROCHLORIDE 0.5 MILLIGRAM(S): 2 INJECTION INTRAMUSCULAR; INTRAVENOUS; SUBCUTANEOUS at 17:45

## 2017-09-04 RX ADMIN — AMPICILLIN SODIUM AND SULBACTAM SODIUM 100 GRAM(S): 250; 125 INJECTION, POWDER, FOR SUSPENSION INTRAMUSCULAR; INTRAVENOUS at 17:39

## 2017-09-04 RX ADMIN — AMPICILLIN SODIUM AND SULBACTAM SODIUM 100 GRAM(S): 250; 125 INJECTION, POWDER, FOR SUSPENSION INTRAMUSCULAR; INTRAVENOUS at 23:10

## 2017-09-04 RX ADMIN — SENNA PLUS 2 TABLET(S): 8.6 TABLET ORAL at 23:11

## 2017-09-04 RX ADMIN — SODIUM CHLORIDE 200 MILLILITER(S): 9 INJECTION, SOLUTION INTRAVENOUS at 03:59

## 2017-09-04 RX ADMIN — HYDROMORPHONE HYDROCHLORIDE 1 MILLIGRAM(S): 2 INJECTION INTRAMUSCULAR; INTRAVENOUS; SUBCUTANEOUS at 01:36

## 2017-09-04 RX ADMIN — AMPICILLIN SODIUM AND SULBACTAM SODIUM 100 GRAM(S): 250; 125 INJECTION, POWDER, FOR SUSPENSION INTRAMUSCULAR; INTRAVENOUS at 09:52

## 2017-09-04 RX ADMIN — Medication 650 MILLIGRAM(S): at 06:41

## 2017-09-04 RX ADMIN — Medication 100 MILLIGRAM(S): at 17:39

## 2017-09-04 RX ADMIN — HYDROMORPHONE HYDROCHLORIDE 0.5 MILLIGRAM(S): 2 INJECTION INTRAMUSCULAR; INTRAVENOUS; SUBCUTANEOUS at 05:54

## 2017-09-04 RX ADMIN — LACTULOSE 20 GRAM(S): 10 SOLUTION ORAL at 11:57

## 2017-09-04 RX ADMIN — Medication 650 MILLIGRAM(S): at 07:11

## 2017-09-04 RX ADMIN — AMPICILLIN SODIUM AND SULBACTAM SODIUM 100 GRAM(S): 250; 125 INJECTION, POWDER, FOR SUSPENSION INTRAMUSCULAR; INTRAVENOUS at 05:29

## 2017-09-04 NOTE — PROGRESS NOTE ADULT - SUBJECTIVE AND OBJECTIVE BOX
Feels better with less abdominal pain  Tolerating fluids Afeb VS stable In RR Clear chest Abd is soft with BS

## 2017-09-04 NOTE — PROGRESS NOTE ADULT - SUBJECTIVE AND OBJECTIVE BOX
Pt seen and examined at bedside  No new c/o today  Denies any fever, chills, n/v/d, bleeding, abdominal pain  Tolerating regular po intake      MEDICATIONS:  MEDICATIONS  (STANDING):  multivitamin/thiamine/folic acid in sodium chloride 0.9% 1000 milliLiter(s) (100 mL/Hr) IV Continuous <Continuous>  senna 2 Tablet(s) Oral at bedtime  docusate sodium 100 milliGRAM(s) Oral two times a day  ampicillin/sulbactam  IVPB 1.5 Gram(s) IV Intermittent every 6 hours  ampicillin/sulbactam  IVPB   IV Intermittent   sodium chloride 0.9%. 1000 milliLiter(s) (80 mL/Hr) IV Continuous <Continuous>    MEDICATIONS  (PRN):  HYDROmorphone  Injectable 0.5 milliGRAM(s) IV Push every 4 hours PRN Moderate Pain  HYDROmorphone  Injectable 1 milliGRAM(s) IV Push every 4 hours PRN Severe Pain  ondansetron Injectable 4 milliGRAM(s) IV Push every 6 hours PRN Nausea  metoclopramide Injectable 10 milliGRAM(s) IV Push every 6 hours PRN Nausea and/or Vomiting  acetaminophen   Tablet 650 milliGRAM(s) Oral every 6 hours PRN For Temp greater than 38 C (100.4 F)      Allergies  No Known Allergies    Intolerances        Vital Signs Last 24 Hrs  T(C): 36.7 (04 Sep 2017 08:43), Max: 37.1 (03 Sep 2017 12:56)  T(F): 98 (04 Sep 2017 08:43), Max: 98.7 (03 Sep 2017 12:56)  HR: 60 (04 Sep 2017 08:43) (60 - 71)  BP: 145/85 (04 Sep 2017 08:43) (127/74 - 151/90)  BP(mean): --  RR: 17 (04 Sep 2017 08:43) (16 - 18)  SpO2: 99% (04 Sep 2017 08:43) (96% - 99%)     @ 07:01  -   @ 07:00  --------------------------------------------------------  IN: 4480 mL / OUT: 1455 mL / NET: 3025 mL     @ 07:01  -   @ 11:00  --------------------------------------------------------  IN: 290 mL / OUT: 400 mL / NET: -110 mL        PHYSICAL EXAM  General: Well developed; well nourished; in no acute distress  HEENT: MMM, conjunctiva and sclera clear  Gastrointestinal: Soft non-tender non-distended; Normal bowel sounds; No hepatosplenomegaly  Skin: Warm and dry. No obvious rash    LABS:  CBC Full  -  ( 04 Sep 2017 07:06 )  WBC Count : 8.2 K/uL  Hemoglobin : 8.0 g/dL  Hematocrit : 23.1 %  Platelet Count - Automated : 223 K/uL  Mean Cell Volume : 92.0 fL  Mean Cell Hemoglobin : 31.9 pg  Mean Cell Hemoglobin Concentration : 34.6 g/dL  Auto Neutrophil # : x  Auto Lymphocyte # : x  Auto Monocyte # : x  Auto Eosinophil # : x  Auto Basophil # : x  Auto Neutrophil % : x  Auto Lymphocyte % : x  Auto Monocyte % : x  Auto Eosinophil % : x  Auto Basophil % : x        131<L>  |  93<L>  |  4<L>  ----------------------------<  111<H>  3.4<L>   |  26  |  0.70    Ca    8.4      04 Sep 2017 07:06    TPro  x   /  Alb  x   /  TBili  1.4<H>  /  DBili  x   /  AST  x   /  ALT  x   /  AlkPhos  x             Urinalysis Basic - ( 02 Sep 2017 16:52 )    Color: Yellow / Appearance: Clear / S.010 / pH: x  Gluc: x / Ketone: NEGATIVE  / Bili: NEGATIVE / Urobili: >=8.0 E.U./dL   Blood: x / Protein: Trace mg/dL / Nitrite: NEGATIVE   Leuk Esterase: NEGATIVE / RBC: x / WBC < 5 /HPF   Sq Epi: x / Non Sq Epi: Rare /HPF / Bacteria: x                RADIOLOGY & ADDITIONAL STUDIES (The following images were personally reviewed):

## 2017-09-04 NOTE — PROGRESS NOTE ADULT - ASSESSMENT
51 YO [facility] with no significant past medical history p/w abdominal pain, nausea, and vomiting x 1 month found to have a distended gallbladder with cholestasis.    # Gallbladder distension   - 2/2 acalculous cholecystitis   - s/p ERCP with sphincterotomy with acalculous cholecystitis   - now gall bladder size is normal  - c/b possible post-ERCP pancreatitis    # Post ERCP pancreatitis -   - CT A/P - negative for perforation, but consistent with interstitial pancreatitis and duodenal wall thickening  - Blood cx - neg to date  - now tolerating regular po intake with no pain  - clinically his pancreatitis has improved    # Anemia -   - likely due to ongoing acute illness  - no evidence of active bleeding - hematemesis, coffee ground, BRBPR, melena, worsening abdominal pain/distention  - no need for active intervention now  - will need anemia workup post hospitalization if persists      Discussed with attending Dr Rogers  GI will follow with you peripherally, if needed urgently please reconsult us

## 2017-09-05 VITALS
SYSTOLIC BLOOD PRESSURE: 122 MMHG | DIASTOLIC BLOOD PRESSURE: 81 MMHG | HEART RATE: 65 BPM | OXYGEN SATURATION: 97 % | RESPIRATION RATE: 17 BRPM | TEMPERATURE: 98 F

## 2017-09-05 LAB
ALBUMIN SERPL ELPH-MCNC: 2.6 G/DL — LOW (ref 3.3–5)
ALP SERPL-CCNC: 141 U/L — HIGH (ref 40–120)
ALT FLD-CCNC: 65 U/L — HIGH (ref 10–45)
ANION GAP SERPL CALC-SCNC: 13 MMOL/L — SIGNIFICANT CHANGE UP (ref 5–17)
AST SERPL-CCNC: 75 U/L — HIGH (ref 10–40)
BILIRUB SERPL-MCNC: 1.4 MG/DL — HIGH (ref 0.2–1.2)
BUN SERPL-MCNC: 5 MG/DL — LOW (ref 7–23)
CALCIUM SERPL-MCNC: 9 MG/DL — SIGNIFICANT CHANGE UP (ref 8.4–10.5)
CHLORIDE SERPL-SCNC: 93 MMOL/L — LOW (ref 96–108)
CO2 SERPL-SCNC: 26 MMOL/L — SIGNIFICANT CHANGE UP (ref 22–31)
CREAT SERPL-MCNC: 0.7 MG/DL — SIGNIFICANT CHANGE UP (ref 0.5–1.3)
GLUCOSE SERPL-MCNC: 99 MG/DL — SIGNIFICANT CHANGE UP (ref 70–99)
HCT VFR BLD CALC: 27.5 % — LOW (ref 39–50)
HGB BLD-MCNC: 9.4 G/DL — LOW (ref 13–17)
MAGNESIUM SERPL-MCNC: 1.6 MG/DL — SIGNIFICANT CHANGE UP (ref 1.6–2.6)
MCHC RBC-ENTMCNC: 31.6 PG — SIGNIFICANT CHANGE UP (ref 27–34)
MCHC RBC-ENTMCNC: 34.2 G/DL — SIGNIFICANT CHANGE UP (ref 32–36)
MCV RBC AUTO: 92.6 FL — SIGNIFICANT CHANGE UP (ref 80–100)
PHOSPHATE SERPL-MCNC: 4 MG/DL — SIGNIFICANT CHANGE UP (ref 2.5–4.5)
PLATELET # BLD AUTO: 325 K/UL — SIGNIFICANT CHANGE UP (ref 150–400)
POTASSIUM SERPL-MCNC: 3.2 MMOL/L — LOW (ref 3.5–5.3)
POTASSIUM SERPL-SCNC: 3.2 MMOL/L — LOW (ref 3.5–5.3)
PROT SERPL-MCNC: 7.2 G/DL — SIGNIFICANT CHANGE UP (ref 6–8.3)
RBC # BLD: 2.97 M/UL — LOW (ref 4.2–5.8)
RBC # FLD: 15.4 % — SIGNIFICANT CHANGE UP (ref 10.3–16.9)
SODIUM SERPL-SCNC: 132 MMOL/L — LOW (ref 135–145)
WBC # BLD: 10.2 K/UL — SIGNIFICANT CHANGE UP (ref 3.8–10.5)
WBC # FLD AUTO: 10.2 K/UL — SIGNIFICANT CHANGE UP (ref 3.8–10.5)

## 2017-09-05 PROCEDURE — 82150 ASSAY OF AMYLASE: CPT

## 2017-09-05 PROCEDURE — 93005 ELECTROCARDIOGRAM TRACING: CPT

## 2017-09-05 PROCEDURE — 71045 X-RAY EXAM CHEST 1 VIEW: CPT

## 2017-09-05 PROCEDURE — 74181 MRI ABDOMEN W/O CONTRAST: CPT

## 2017-09-05 PROCEDURE — 80048 BASIC METABOLIC PNL TOTAL CA: CPT

## 2017-09-05 PROCEDURE — C1769: CPT

## 2017-09-05 PROCEDURE — 86850 RBC ANTIBODY SCREEN: CPT

## 2017-09-05 PROCEDURE — 82248 BILIRUBIN DIRECT: CPT

## 2017-09-05 PROCEDURE — 86901 BLOOD TYPING SEROLOGIC RH(D): CPT

## 2017-09-05 PROCEDURE — 87086 URINE CULTURE/COLONY COUNT: CPT

## 2017-09-05 PROCEDURE — 74020: CPT

## 2017-09-05 PROCEDURE — 83605 ASSAY OF LACTIC ACID: CPT

## 2017-09-05 PROCEDURE — 36415 COLL VENOUS BLD VENIPUNCTURE: CPT

## 2017-09-05 PROCEDURE — 85025 COMPLETE CBC W/AUTO DIFF WBC: CPT

## 2017-09-05 PROCEDURE — 80076 HEPATIC FUNCTION PANEL: CPT

## 2017-09-05 PROCEDURE — 82247 BILIRUBIN TOTAL: CPT

## 2017-09-05 PROCEDURE — 81001 URINALYSIS AUTO W/SCOPE: CPT

## 2017-09-05 PROCEDURE — 85730 THROMBOPLASTIN TIME PARTIAL: CPT

## 2017-09-05 PROCEDURE — 80074 ACUTE HEPATITIS PANEL: CPT

## 2017-09-05 PROCEDURE — 86140 C-REACTIVE PROTEIN: CPT

## 2017-09-05 PROCEDURE — 74330 X-RAY BILE/PANC ENDOSCOPY: CPT

## 2017-09-05 PROCEDURE — 74176 CT ABD & PELVIS W/O CONTRAST: CPT

## 2017-09-05 PROCEDURE — 78227 HEPATOBIL SYST IMAGE W/DRUG: CPT

## 2017-09-05 PROCEDURE — 80053 COMPREHEN METABOLIC PANEL: CPT

## 2017-09-05 PROCEDURE — 83735 ASSAY OF MAGNESIUM: CPT

## 2017-09-05 PROCEDURE — 85610 PROTHROMBIN TIME: CPT

## 2017-09-05 PROCEDURE — 87150 DNA/RNA AMPLIFIED PROBE: CPT

## 2017-09-05 PROCEDURE — 76705 ECHO EXAM OF ABDOMEN: CPT

## 2017-09-05 PROCEDURE — 86900 BLOOD TYPING SEROLOGIC ABO: CPT

## 2017-09-05 PROCEDURE — A9537: CPT

## 2017-09-05 PROCEDURE — 96374 THER/PROPH/DIAG INJ IV PUSH: CPT

## 2017-09-05 PROCEDURE — 87040 BLOOD CULTURE FOR BACTERIA: CPT

## 2017-09-05 PROCEDURE — 83690 ASSAY OF LIPASE: CPT

## 2017-09-05 PROCEDURE — 99285 EMERGENCY DEPT VISIT HI MDM: CPT | Mod: 25

## 2017-09-05 PROCEDURE — 84100 ASSAY OF PHOSPHORUS: CPT

## 2017-09-05 PROCEDURE — 85027 COMPLETE CBC AUTOMATED: CPT

## 2017-09-05 RX ORDER — MULTIVIT WITH MIN/MFOLATE/K2 340-15/3 G
300 POWDER (GRAM) ORAL ONCE
Qty: 0 | Refills: 0 | Status: COMPLETED | OUTPATIENT
Start: 2017-09-05 | End: 2017-09-05

## 2017-09-05 RX ORDER — HYDROMORPHONE HYDROCHLORIDE 2 MG/ML
0.5 INJECTION INTRAMUSCULAR; INTRAVENOUS; SUBCUTANEOUS EVERY 4 HOURS
Qty: 0 | Refills: 0 | Status: DISCONTINUED | OUTPATIENT
Start: 2017-09-05 | End: 2017-09-05

## 2017-09-05 RX ORDER — LACTULOSE 10 G/15ML
20 SOLUTION ORAL ONCE
Qty: 0 | Refills: 0 | Status: COMPLETED | OUTPATIENT
Start: 2017-09-05 | End: 2017-09-05

## 2017-09-05 RX ORDER — POTASSIUM CHLORIDE 20 MEQ
40 PACKET (EA) ORAL
Qty: 0 | Refills: 0 | Status: COMPLETED | OUTPATIENT
Start: 2017-09-05 | End: 2017-09-05

## 2017-09-05 RX ORDER — MAGNESIUM SULFATE 500 MG/ML
2 VIAL (ML) INJECTION ONCE
Qty: 0 | Refills: 0 | Status: COMPLETED | OUTPATIENT
Start: 2017-09-05 | End: 2017-09-05

## 2017-09-05 RX ADMIN — Medication 300 MILLILITER(S): at 08:47

## 2017-09-05 RX ADMIN — AMPICILLIN SODIUM AND SULBACTAM SODIUM 100 GRAM(S): 250; 125 INJECTION, POWDER, FOR SUSPENSION INTRAMUSCULAR; INTRAVENOUS at 06:48

## 2017-09-05 RX ADMIN — Medication 40 MILLIEQUIVALENT(S): at 11:15

## 2017-09-05 RX ADMIN — HYDROMORPHONE HYDROCHLORIDE 0.5 MILLIGRAM(S): 2 INJECTION INTRAMUSCULAR; INTRAVENOUS; SUBCUTANEOUS at 04:00

## 2017-09-05 RX ADMIN — LACTULOSE 20 GRAM(S): 10 SOLUTION ORAL at 08:47

## 2017-09-05 RX ADMIN — HYDROMORPHONE HYDROCHLORIDE 0.5 MILLIGRAM(S): 2 INJECTION INTRAMUSCULAR; INTRAVENOUS; SUBCUTANEOUS at 13:05

## 2017-09-05 RX ADMIN — Medication 40 MILLIEQUIVALENT(S): at 08:47

## 2017-09-05 RX ADMIN — Medication 50 GRAM(S): at 08:47

## 2017-09-05 RX ADMIN — Medication 100 MILLIGRAM(S): at 06:48

## 2017-09-05 RX ADMIN — HYDROMORPHONE HYDROCHLORIDE 0.5 MILLIGRAM(S): 2 INJECTION INTRAMUSCULAR; INTRAVENOUS; SUBCUTANEOUS at 12:51

## 2017-09-05 RX ADMIN — HYDROMORPHONE HYDROCHLORIDE 0.5 MILLIGRAM(S): 2 INJECTION INTRAMUSCULAR; INTRAVENOUS; SUBCUTANEOUS at 03:45

## 2017-09-05 RX ADMIN — AMPICILLIN SODIUM AND SULBACTAM SODIUM 100 GRAM(S): 250; 125 INJECTION, POWDER, FOR SUSPENSION INTRAMUSCULAR; INTRAVENOUS at 11:16

## 2017-09-05 NOTE — PROGRESS NOTE ADULT - ASSESSMENT
Hct stable Bili decreased Still no BM  To give bowel prep today to achieve BM  Then discharge Discussed the possibility that the GB will flare again in which case a cholecystectomy will be necessary

## 2017-09-05 NOTE — PROGRESS NOTE ADULT - PROVIDER SPECIALTY LIST ADULT
Gastroenterology
Internal Medicine
Surgery

## 2017-09-06 LAB
CULTURE RESULTS: SIGNIFICANT CHANGE UP
CULTURE RESULTS: SIGNIFICANT CHANGE UP
SPECIMEN SOURCE: SIGNIFICANT CHANGE UP
SPECIMEN SOURCE: SIGNIFICANT CHANGE UP

## 2017-09-07 DIAGNOSIS — E80.6 OTHER DISORDERS OF BILIRUBIN METABOLISM: ICD-10-CM

## 2017-09-07 DIAGNOSIS — K82.8 OTHER SPECIFIED DISEASES OF GALLBLADDER: ICD-10-CM

## 2017-09-07 DIAGNOSIS — K85.90 ACUTE PANCREATITIS WITHOUT NECROSIS OR INFECTION, UNSPECIFIED: ICD-10-CM

## 2017-09-07 DIAGNOSIS — F10.10 ALCOHOL ABUSE, UNCOMPLICATED: ICD-10-CM

## 2017-09-10 DIAGNOSIS — K81.0 ACUTE CHOLECYSTITIS: ICD-10-CM
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
## 2017-09-10 DIAGNOSIS — K82.1 HYDROPS OF GALLBLADDER: ICD-10-CM
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
## 2017-09-10 DIAGNOSIS — R65.10 SYSTEMIC INFLAMMATORY RESPONSE SYNDROME (SIRS) OF NON-INFECTIOUS ORIGIN WITHOUT ACUTE ORGAN DYSFUNCTION: ICD-10-CM

## 2017-09-10 DIAGNOSIS — K91.89 OTHER POSTPROCEDURAL COMPLICATIONS AND DISORDERS OF DIGESTIVE SYSTEM: ICD-10-CM

## 2021-10-21 NOTE — PATIENT PROFILE ADULT. - FUNCTIONAL SCREEN CURRENT LEVEL: DRESSING, MLM
Dry Skin Care    1.  Use soap less often, and try a mild fragrance-free soap.  2.  Use moisturizer during the day as often as possible.   3.  Within 3 minutes after bathing, apply the moisturizer to your entire body to trap the moisture in your skin.  4.  Avoid long, hot showers because hot water removes oils from your skin more quickly.   5.  Ointments (clear, thick, greasy) and creams (white, thick, in a jar) work better as moisturizers than lotions (white, thin, easier to spread).    6.  Ointment examples: Aquaphor ointment, CeraVe Healing Ointment, Vaseline (petrolatum, petroleum jelly).  7.  Cream examples (creams are typically in jars): Vanicream, Cetaphil, CeraVe, Eucerin, Lubriderm, Aveeno, Oil of Olay, Moisturel, SBR Lipocream, Curel, DML Forte  8.  Mild soap examples: Dove, Oil of Olay, Cetaphil, Purpose  9.  Good creams for dry hands: Neutrogena (Norwegian) Handcream, Eucerin      (0) independent

## 2023-12-28 NOTE — ED ADULT NURSE NOTE - NS ED NOTE ABUSE RESPONSE YN
You can access the FollowMyHealth Patient Portal offered by [facility] by registering at the following website: [email or web address]. By joining [employer]’s FollowMyHealth portal, you will also be able to view your health information using other applications (apps) compatible with our system. You can access the FollowMyHealth Patient Portal offered by [facility] by registering at the following website: [email or web address]. By joining [employer]’s FollowMyHealth portal, you will also be able to view your health information using other applications (apps) compatible with our system. Yes [employer] Pregnancy And Lactation Text: This medication is Pregnancy Category B and is considered safe. There is some data to suggest avoiding during the third trimester. It is unknown if this medication is excreted in breast milk.

## 2024-01-29 NOTE — CHART NOTE - NSCHARTNOTEFT_GEN_A_CORE
Patient is a 52y old  Male who presents with a chief complaint of Abd pain and nausea. Found to have hydrops of the GB. S/p ERCP (8/31).    PAST MEDICAL & SURGICAL HISTORY:  Alcohol abuse  No significant past surgical history      Current Nutrition Order:  Diet, Clear Liquid:   Supplement Feeding Modality:  Oral  Ensure Clear Cans or Servings Per Day:  2       Frequency:  Three Times a day (09-01-17 @ 08:48)      PO Intake: Good (%) [   ]  Fair (50-75%) [   ] Poor (<25%) [  X ]- of clear liquid diet and supplements    GI Issues: pt c/o 10/10 abdominal pain at time. Denies N/V/D/C    Pain: Abdominal pain noted    Skin Integrity:Intact     Labs:   09-01    132<L>  |  96  |  6<L>  ----------------------------<  103<H>  3.9   |  24  |  0.80    Ca    8.0<L>      01 Sep 2017 07:30  Phos  2.4     09-01  Mg     1.5     09-01    TPro  6.5  /  Alb  2.5<L>  /  TBili  3.1<H>  /  DBili  1.5<H>  /  AST  65<H>  /  ALT  59<H>  /  AlkPhos  140<H>  09-01    Medications:  lactated ringers. 1000 milliLiter(s) (200 mL/Hr) IV Continuous <Continuous>  magnesium sulfate  IVPB 2 Gram(s) IV Intermittent every 6 hours  multivitamin/thiamine/folic acid in sodium chloride 0.9% 1000 milliLiter(s) (100 mL/Hr) IV Continuous <Continuous>    MEDICATIONS  (PRN):  HYDROmorphone  Injectable 0.5 milliGRAM(s) IV Push every 4 hours PRN Moderate Pain  HYDROmorphone  Injectable 1 milliGRAM(s) IV Push every 4 hours PRN Severe Pain  ondansetron Injectable 4 milliGRAM(s) IV Push every 6 hours PRN Nausea  metoclopramide Injectable 10 milliGRAM(s) IV Push every 6 hours PRN Nausea and/or Vomiting      Weight: 68kg on admission   (8/30): 68kg- no change  Weight Change: none at present since admission.     Estimated energy needs:   1700-2040kcal/day (25-30kca/kg)  81.6-95.2g pro/ day (1.2-1.4g pro/kg)  2040-2380mL/day (30-35mL/kg)      Subjective:   Pt s/p ERCP 8/31. c/o continued abdominal pain (team aware). Pt states he does not like the clear liquids and wants solids, however, states he has "bad stomach pain." RD discussed purpose of clear liquid diet for bowel rest. Additionally discussed vitamin supplementation in setting of EtOH hx. Encouraged pt to continue B-complex w/ thiamine upon d/c. RD provided written/verbal edu for low fat diet (for future diet advance). Pt sates he lives alone and does not cook. States he usually eats: fruit, crackers, and American cheese. RD discussed low fat, minimal prep items pt may consider when diet advanced and d/c'ed. Per MD note, will need lap choley soon. Jaundice to sclera noted.     Previous Nutrition Diagnosis: Inadequate intake RT inability to meet needs AEB 18# wt loss     Active [   X]  Resolved [   ] however, new PES:     Inadequate oral intake RT abdominal pain and decreased appetite AEB pt consuming sips to 25% of clear liquid diet.     Goal: Pt to consume >50-75% of clear liquid trays. Diet advance to solids w/in 48hrs as medically feasible.       Recommendations:  - Continue w/ IVF w/ MVI/Thiamine  - Continue w/ Ensure clear supplementation  - Advance to low fat diet as medically feasible.     Education: RD provided written/verbal edu on low fat diet. Pt verbalized understanding but anticipated level of compliance expected is low.     Risk Level: High [  X ] Moderate [   ] Low [   ] Patient is a 52y old  Male who presents with a chief complaint of Abd pain and nausea. Found to have hydrops of the GB. S/p ERCP (8/31).    PAST MEDICAL & SURGICAL HISTORY:  Alcohol abuse  No significant past surgical history      Current Nutrition Order:  Diet, Clear Liquid:   Supplement Feeding Modality:  Oral  Ensure Clear Cans or Servings Per Day:  2       Frequency:  Three Times a day (09-01-17 @ 08:48)      PO Intake: Good (%) [   ]  Fair (50-75%) [   ] Poor (<25%) [  X ]- of clear liquid diet and supplements    GI Issues: pt c/o 10/10 abdominal pain at time. Denies N/V/D/C    Pain: Abdominal pain noted    Skin Integrity:Intact     Labs:   09-01    132<L>  |  96  |  6<L>  ----------------------------<  103<H>  3.9   |  24  |  0.80    Ca    8.0<L>      01 Sep 2017 07:30  Phos  2.4     09-01  Mg     1.5     09-01    TPro  6.5  /  Alb  2.5<L>  /  TBili  3.1<H>  /  DBili  1.5<H>  /  AST  65<H>  /  ALT  59<H>  /  AlkPhos  140<H>  09-01    Medications:  lactated ringers. 1000 milliLiter(s) (200 mL/Hr) IV Continuous <Continuous>  magnesium sulfate  IVPB 2 Gram(s) IV Intermittent every 6 hours  multivitamin/thiamine/folic acid in sodium chloride 0.9% 1000 milliLiter(s) (100 mL/Hr) IV Continuous <Continuous>    MEDICATIONS  (PRN):  HYDROmorphone  Injectable 0.5 milliGRAM(s) IV Push every 4 hours PRN Moderate Pain  HYDROmorphone  Injectable 1 milliGRAM(s) IV Push every 4 hours PRN Severe Pain  ondansetron Injectable 4 milliGRAM(s) IV Push every 6 hours PRN Nausea  metoclopramide Injectable 10 milliGRAM(s) IV Push every 6 hours PRN Nausea and/or Vomiting      Weight: 68kg on admission   (8/30): 68kg- no change  Weight Change: none at present since admission.     Estimated energy needs:   1700-2040kcal/day (25-30kca/kg)  81.6-95.2g pro/ day (1.2-1.4g pro/kg)  2040-2380mL/day (30-35mL/kg)      Subjective:   Pt s/p ERCP 8/31. c/o continued abdominal pain (team aware). Pt states he does not like the clear liquids and wants solids, however, states he has "bad stomach pain." RD discussed purpose of clear liquid diet for bowel rest. Additionally discussed vitamin supplementation in setting of EtOH hx. Encouraged pt to continue B-complex w/ thiamine upon d/c. RD provided written/verbal edu for low fat diet (for future diet advance). Pt sates he lives alone and does not cook. States he usually eats: fruit, crackers, and American cheese. RD discussed low fat, minimal prep items pt may consider when diet advanced and d/c'ed. Per MD note, will need lap choley soon. Jaundice to sclera noted.     Previous Nutrition Diagnosis: Inadequate intake RT inability to meet needs AEB 18# wt loss     Active [   X]  Resolved [   ] however, new PES:     Inadequate oral intake RT abdominal pain and decreased appetite AEB pt consuming sips to 25% of clear liquid diet.     Goal: Pt to consume >50-75% of clear liquid trays. Diet advance to solids w/in 48hrs as medically feasible.       Recommendations:  - Continue w/ IVF w/ MVI/Thiamine  - Continue w/ Ensure clear supplementation  - Advance to low fat diet as medically feasible.   - Obtain weekly weights     Education: RD provided written/verbal edu on low fat diet. Pt verbalized understanding but anticipated level of compliance expected is low.     Risk Level: High [  X ] Moderate [   ] Low [   ] Additional Notes: Ddx includes AK vs SCCIS. Declines biopsy today. LN2 as above; RTO 4-6 weeks if not resolved. Detail Level: Zone Render Risk Assessment In Note?: no

## 2025-04-08 NOTE — ED ADULT NURSE NOTE - BREATH SOUNDS, MLM
Try 4 puffs albuterol with spacer and mask before bed and when he has coughing fits to see if it helps with the cough  Stop zyrtec and try children's claritin daily insteas  Continue flonase daily  Return for follow up in 1 month  
Clear